# Patient Record
Sex: MALE | Race: WHITE | NOT HISPANIC OR LATINO | Employment: OTHER | URBAN - METROPOLITAN AREA
[De-identification: names, ages, dates, MRNs, and addresses within clinical notes are randomized per-mention and may not be internally consistent; named-entity substitution may affect disease eponyms.]

---

## 2023-03-22 PROBLEM — E53.8 VITAMIN B 12 DEFICIENCY: Status: ACTIVE | Noted: 2023-03-22

## 2023-03-22 PROBLEM — E03.9 HYPOTHYROIDISM: Status: ACTIVE | Noted: 2023-03-22

## 2023-03-22 PROBLEM — M19.079 OSTEOARTHRITIS OF TOE: Status: ACTIVE | Noted: 2023-03-22

## 2023-03-22 PROBLEM — G25.81 RLS (RESTLESS LEGS SYNDROME): Status: ACTIVE | Noted: 2023-03-22

## 2023-03-22 PROBLEM — D64.9 ANEMIA: Status: ACTIVE | Noted: 2023-03-22

## 2023-03-22 PROBLEM — G56.01 CARPAL TUNNEL SYNDROME OF RIGHT WRIST: Status: ACTIVE | Noted: 2023-03-22

## 2023-03-22 PROBLEM — I10 HTN (HYPERTENSION): Status: ACTIVE | Noted: 2023-03-22

## 2023-03-22 PROBLEM — E55.9 VITAMIN D DEFICIENCY: Status: ACTIVE | Noted: 2023-03-22

## 2023-03-22 PROBLEM — R60.9 DEPENDENT EDEMA: Status: ACTIVE | Noted: 2023-03-22

## 2023-03-22 PROBLEM — D72.819 CHRONIC LEUKOPENIA: Status: ACTIVE | Noted: 2023-03-22

## 2023-03-22 RX ORDER — FUROSEMIDE 40 MG/1
40 TABLET ORAL
COMMUNITY
Start: 2022-05-17 | End: 2023-05-05 | Stop reason: SDUPTHER

## 2023-03-22 RX ORDER — ERGOCALCIFEROL 1.25 MG/1
1 CAPSULE ORAL
COMMUNITY
Start: 2022-05-19 | End: 2023-06-01 | Stop reason: SDUPTHER

## 2023-03-22 RX ORDER — DULOXETIN HYDROCHLORIDE 30 MG/1
1 CAPSULE, DELAYED RELEASE ORAL DAILY
COMMUNITY
Start: 2022-04-29 | End: 2023-05-05 | Stop reason: SDUPTHER

## 2023-03-22 RX ORDER — DOCUSATE SODIUM 100 MG/1
1 CAPSULE, LIQUID FILLED ORAL 2 TIMES DAILY
COMMUNITY
Start: 2022-04-29 | End: 2023-06-01 | Stop reason: ALTCHOICE

## 2023-03-22 RX ORDER — TAMSULOSIN HYDROCHLORIDE 0.4 MG/1
2 CAPSULE ORAL DAILY
COMMUNITY
Start: 2022-04-29 | End: 2023-05-05 | Stop reason: SDUPTHER

## 2023-03-22 RX ORDER — FINASTERIDE 5 MG/1
1 TABLET, FILM COATED ORAL DAILY
COMMUNITY
Start: 2022-04-29 | End: 2023-05-05 | Stop reason: SDUPTHER

## 2023-03-22 RX ORDER — ROPINIROLE 4 MG/1
4 TABLET, FILM COATED ORAL 2 TIMES DAILY
COMMUNITY
Start: 2017-10-27 | End: 2023-06-01 | Stop reason: SDUPTHER

## 2023-03-22 RX ORDER — POTASSIUM CHLORIDE 750 MG/1
10 TABLET, EXTENDED RELEASE ORAL DAILY
COMMUNITY
Start: 2022-05-17 | End: 2023-05-05 | Stop reason: SDUPTHER

## 2023-03-22 RX ORDER — DOXAZOSIN 4 MG/1
1 TABLET ORAL NIGHTLY
COMMUNITY
Start: 2018-04-30 | End: 2023-05-23

## 2023-03-22 RX ORDER — OMEPRAZOLE 20 MG/1
20 CAPSULE, DELAYED RELEASE ORAL DAILY
COMMUNITY
Start: 2022-05-01 | End: 2023-05-05 | Stop reason: SDUPTHER

## 2023-03-28 ENCOUNTER — APPOINTMENT (OUTPATIENT)
Dept: PRIMARY CARE | Facility: CLINIC | Age: 88
End: 2023-03-28

## 2023-04-12 ENCOUNTER — TELEPHONE (OUTPATIENT)
Dept: PRIMARY CARE | Facility: CLINIC | Age: 88
End: 2023-04-12
Payer: COMMERCIAL

## 2023-04-14 ENCOUNTER — TELEMEDICINE (OUTPATIENT)
Dept: PRIMARY CARE | Facility: CLINIC | Age: 88
End: 2023-04-14

## 2023-04-14 VITALS — BODY MASS INDEX: 25.46 KG/M2 | HEIGHT: 65 IN

## 2023-04-14 DIAGNOSIS — R35.0 BENIGN PROSTATIC HYPERPLASIA WITH URINARY FREQUENCY: ICD-10-CM

## 2023-04-14 DIAGNOSIS — N48.22 PENILE CELLULITIS: ICD-10-CM

## 2023-04-14 DIAGNOSIS — I10 PRIMARY HYPERTENSION: Primary | ICD-10-CM

## 2023-04-14 DIAGNOSIS — N40.1 BENIGN PROSTATIC HYPERPLASIA WITH URINARY FREQUENCY: ICD-10-CM

## 2023-04-14 DIAGNOSIS — L03.90 CELLULITIS, UNSPECIFIED CELLULITIS SITE: ICD-10-CM

## 2023-04-14 PROCEDURE — 99213 OFFICE O/P EST LOW 20 MIN: CPT | Performed by: INTERNAL MEDICINE

## 2023-04-14 RX ORDER — CEPHALEXIN 500 MG/1
500 CAPSULE ORAL 3 TIMES DAILY
Qty: 30 CAPSULE | Refills: 0 | Status: SHIPPED | OUTPATIENT
Start: 2023-04-14 | End: 2023-05-05 | Stop reason: ALTCHOICE

## 2023-04-14 RX ORDER — FLUCONAZOLE 150 MG/1
150 TABLET ORAL ONCE
Qty: 3 TABLET | Refills: 0 | Status: SHIPPED | OUTPATIENT
Start: 2023-04-14 | End: 2023-04-14

## 2023-04-14 NOTE — ASSESSMENT & PLAN NOTE
CBC BMP PSA urinalysis given Keflex 503 times a day probiotic 3 times a day Diflucan 151 a day for 3 days outpatient follow-up with urologist if not better go to emergency room right away

## 2023-04-14 NOTE — PROGRESS NOTES
Patient ID: Isra Steven is a 89 y.o. male who presents for Establish Care (Inflammation on his penis-- given by Urologist before).    Assessment/Plan     Problem List Items Addressed This Visit          Circulatory    HTN (hypertension) - Primary     Patients BP readings reviewed and addressed, as we age our arteries turn stiffer and less elastic. Restricting salt consumption and staying physically fit with regular exercise regimen is the only way to keep our vasculature less tonic. Studies have shown that keeping ideal body wt, exercise routine about 140 to 150 minutes a week, eating variety of plant based diet and drinking plentiful water are quite helpful. Monitor BP twice or once a week at home and bring log to be reviewed by me. Uncontrolled BP has long term consequences including heart failure, myocardial infarction, accelerated atherosclerosis and kidney dysfunction. Therapy reviewed and explained.              Genitourinary    Penile cellulitis     CBC BMP PSA urinalysis given Keflex 503 times a day probiotic 3 times a day Diflucan 151 a day for 3 days outpatient follow-up with urologist if not better go to emergency room right away            Other    Benign prostatic hyperplasia with urinary frequency     Other Visit Diagnoses       Cellulitis, unspecified cellulitis site        Relevant Medications    cephalexin (Keflex) 500 mg capsule    fluconazole (Diflucan) 150 mg tablet            Source of history: Nurse, Medical personnel, Medical record, Patient.  History limitation: None.      HPI  This is a 89-year-old patient who had a history of BPH hypertension hyperlipidemia edema gastritis restless legs seen by urologist noticed increased redness swelling pain in the penile foreskin also scrotal area onset acutely duration 1 week progressed acutely aggravating factor urinary incontinence immunocompromise host given Keflex Diflucan probiotics refer patient to urologist check the CBC BMP UA CNS and PSA  "follow-up  Allergies   Allergen Reactions    Brimonidine Itching    Dorzolamide-Timolol Itching    Metipranolol Itching    Timolol Itching    Travoprost Itching       Medications    Current Outpatient Medications   Medication Sig Dispense Refill    cephalexin (Keflex) 500 mg capsule Take 1 capsule (500 mg) by mouth in the morning and 1 capsule (500 mg) in the evening and 1 capsule (500 mg) before bedtime. 30 capsule 0    docusate sodium (Colace) 100 mg capsule Take 1 capsule (100 mg) by mouth in the morning and 1 capsule (100 mg) before bedtime.      doxazosin (Cardura) 4 mg tablet Take 1 tablet (4 mg) by mouth once daily at bedtime.      DULoxetine (Cymbalta) 30 mg DR capsule Take 1 capsule (30 mg) by mouth once daily.      ergocalciferol (Vitamin D-2) 1.25 MG (71579 UT) capsule Take 1 capsule (1,250 mcg) by mouth 1 (one) time per week.      finasteride (Proscar) 5 mg tablet Take 1 tablet (5 mg) by mouth once daily.      fluconazole (Diflucan) 150 mg tablet Take 1 tablet (150 mg) by mouth 1 time for 1 dose. 3 tablet 0    furosemide (Lasix) 40 mg tablet Take 1 tablet (40 mg) by mouth once daily in the morning. Take before meals.      omeprazole (PriLOSEC) 20 mg DR capsule Take 1 capsule (20 mg) by mouth once daily.      potassium chloride CR (Klor-Con M10) 10 mEq ER tablet Take 1 tablet (10 mEq) by mouth once daily.      rOPINIRole (Requip) 4 mg tablet Take 1 tablet (4 mg) by mouth in the morning and 1 tablet (4 mg) before bedtime.      tamsulosin (Flomax) 0.4 mg 24 hr capsule Take 2 capsules (0.8 mg) by mouth once daily.       No current facility-administered medications for this visit.       Objective   Visit Vitals  Ht 1.651 m (5' 5\")   BMI 25.46 kg/m²   Smoking Status Never   BSA 1.78 m²       .Doxy  This visit was completed via video audio relation to  covid 19 pandemic all issues as below that discuss and address but no physical exam was performed if it was felt that patient should be evaluated in clinic and " they have been advised to follow . Patient verbally consented to visit and spent  more than 50% discuss about patient's complaint of problem and plan        ROS  Constitutional: Denies fevers, chills, fatigue, weight loss/gain  HEENT: Denies HA, vision changes, hearing loss, sore throat  Cardiac: Denies CP, palpitations, edema  Respiratory: Denies SOB, cough, pleuritic chest pain, PND, orthopnea  GI: Denies N/V/D, abd pain, constipation, black/bloody stools  : Denies urinary changes, frequency, hematuria, urgency, retention, flank pain  MSK: Denies joint pain, joint swelling, back pain, neck pain, extremity pain  Neuro: Denies numbness, weakness, tingling    Immunization History   Administered Date(s) Administered    Influenza, seasonal, injectable 09/18/2018    Pfizer Purple Cap SARS-CoV-2 03/09/2021, 04/02/2021    Tdap 03/27/2018    Zoster, Unspecified 10/12/2020, 12/28/2020       No visits with results within 4 Month(s) from this visit.   Latest known visit with results is:   Legacy Encounter on 05/17/2022   Component Date Value Ref Range Status    WBC 05/17/2022 4.1 (L)  4.4 - 11.3 x10E9/L Final    nRBC 05/17/2022 0.0  0.0 - 0.0 /100 WBC Final    RBC 05/17/2022 3.11 (L)  4.50 - 5.90 x10E12/L Final    Hemoglobin 05/17/2022 10.1 (L)  13.5 - 17.5 g/dL Final    Hematocrit 05/17/2022 31.0 (L)  41.0 - 52.0 % Final    MCV 05/17/2022 100  80 - 100 fL Final    MCHC 05/17/2022 32.6  32.0 - 36.0 g/dL Final    Platelets 05/17/2022 141 (L)  150 - 450 x10E9/L Final    RDW 05/17/2022 14.6 (H)  11.5 - 14.5 % Final    Neutrophils % 05/17/2022 50.0  40.0 - 80.0 % Final    Immature Granulocytes %, Automated 05/17/2022 0.2  0.0 - 0.9 % Final    Lymphocytes % 05/17/2022 35.1  13.0 - 44.0 % Final    Monocytes % 05/17/2022 9.6  2.0 - 10.0 % Final    Eosinophils % 05/17/2022 4.4  0.0 - 6.0 % Final    Basophils % 05/17/2022 0.7  0.0 - 2.0 % Final    Neutrophils Absolute 05/17/2022 2.03  1.60 - 5.50 x10E9/L Final    Lymphocytes  Absolute 05/17/2022 1.43  0.80 - 3.00 x10E9/L Final    Monocytes Absolute 05/17/2022 0.39  0.05 - 0.80 x10E9/L Final    Eosinophils Absolute 05/17/2022 0.18  0.00 - 0.40 x10E9/L Final    Basophils Absolute 05/17/2022 0.03  0.00 - 0.10 x10E9/L Final    Hemoglobin A1C 05/17/2022 5.3  % Final    Estimated Average Glucose 05/17/2022 105  MG/DL Final    Iron 05/17/2022 52  35 - 150 ug/dL Final    PSA 05/17/2022 1.00  0.00 - 4.00 ng/mL Final    Glucose 05/17/2022 95  74 - 99 mg/dL Final    Sodium 05/17/2022 142  136 - 145 mmol/L Final    Potassium 05/17/2022 4.1  3.5 - 5.3 mmol/L Final    Chloride 05/17/2022 109 (H)  98 - 107 mmol/L Final    Bicarbonate 05/17/2022 27  21 - 32 mmol/L Final    Anion Gap 05/17/2022 10  10 - 20 mmol/L Final    Urea Nitrogen 05/17/2022 25 (H)  6 - 23 mg/dL Final    Creatinine 05/17/2022 1.13  0.50 - 1.30 mg/dL Final    GFR MALE 05/17/2022 60  >90 mL/min/1.73m2 Final    Calcium 05/17/2022 8.9  8.6 - 10.6 mg/dL Final    Albumin 05/17/2022 3.7  3.4 - 5.0 g/dL Final    Alkaline Phosphatase 05/17/2022 94  33 - 136 U/L Final    Total Protein 05/17/2022 6.5  6.4 - 8.2 g/dL Final    AST 05/17/2022 21  9 - 39 U/L Final    Total Bilirubin 05/17/2022 0.7  0.0 - 1.2 mg/dL Final    ALT (SGPT) 05/17/2022 11  10 - 52 U/L Final    TSH 05/17/2022 4.74 (H)  0.44 - 3.98 mIU/L Final    Vitamin B-12 05/17/2022 598  211 - 911 pg/mL Final    Vitamin D, 25-Hydroxy 05/17/2022 27 (A)  ng/mL Final    Color, Urine 05/17/2022 YELLOW  STRAW,YELLOW Final    Appearance, Urine 05/17/2022 CLEAR  CLEAR Final    Specific Gravity, Urine 05/17/2022 1.017  1.005 - 1.035 Final    pH, Urine 05/17/2022 5.0  5.0 - 8.0 Final    Protein, Urine 05/17/2022 NEGATIVE  NEGATIVE mg/dL Final    Glucose, Urine 05/17/2022 NEGATIVE  NEGATIVE mg/dL Final    Blood, Urine 05/17/2022 NEGATIVE  NEGATIVE Final    Ketones, Urine 05/17/2022 NEGATIVE  NEGATIVE mg/dL Final    Bilirubin, Urine 05/17/2022 NEGATIVE  NEGATIVE Final    Urobilinogen, Urine  05/17/2022 <2.0  0.0 - 1.9 mg/dL Final    Nitrite, Urine 05/17/2022 NEGATIVE  NEGATIVE Final    Leukocyte Esterase, Urine 05/17/2022 NEGATIVE  NEGATIVE Final       Radiology: Reviewed imaging in powerchart.  No results found.    Family History   Family history unknown: Yes     Social History     Socioeconomic History    Marital status:      Spouse name: None    Number of children: None    Years of education: None    Highest education level: None   Occupational History    None   Tobacco Use    Smoking status: Never    Smokeless tobacco: Never   Vaping Use    Vaping status: None   Substance and Sexual Activity    Alcohol use: Yes     Comment: occasional    Drug use: Never    Sexual activity: None   Other Topics Concern    None   Social History Narrative    None     Social Determinants of Health     Financial Resource Strain: Not on file   Food Insecurity: Not on file   Transportation Needs: Not on file   Physical Activity: Not on file   Stress: Not on file   Social Connections: Not on file   Intimate Partner Violence: Not on file   Housing Stability: Not on file     Past Medical History:   Diagnosis Date    Abnormal weight loss 08/23/2017    Weight loss, non-intentional    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, 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/30/2018    Traumatic amputation of fingertip, subsequent encounter    Do not resuscitate 06/08/2020    DNR (do not resuscitate)    Encounter for general adult medical examination without abnormal findings 08/24/2020    Medicare annual wellness visit, subsequent    Epigastric pain 06/17/2021    Midepigastric pain    Follicular cyst of the skin and subcutaneous tissue, unspecified 07/09/2019    Skin cyst    Impacted cerumen, bilateral 03/20/2017    Bilateral impacted cerumen    Left lower quadrant pain     Abdominal pain, LLQ    Localized edema 09/30/2021    Edema of both legs    Localized swelling, mass and lump, unspecified upper limb 08/23/2017     Shoulder mass    Low back pain, unspecified 09/18/2018    Acute left-sided low back pain without sciatica    Other conditions influencing health status 03/20/2017    History of cough    Other conditions influencing health status 01/05/2015    White coat hypertension    Other specified soft tissue disorders 02/25/2021    Swelling of both lower extremities    Periodic limb movement disorder 10/27/2017    Periodic limb movement disorder    Personal history of diseases of the blood and blood-forming organs and certain disorders involving the immune mechanism 01/06/2015    History of macrocytosis    Personal history of other diseases of male genital organs 02/02/2021    History of balanitis    Personal history of other diseases of male genital organs 02/01/2021    History of phimosis of penis    Personal history of other diseases of the respiratory system 10/26/2016    History of bronchiectasis    Personal history of other diseases of the respiratory system 10/26/2016    History of acute bronchitis    Personal history of other drug therapy 12/15/2014    History of influenza vaccination    Personal history of other endocrine, nutritional and metabolic disease     History of hypercholesterolemia    Personal history of other mental and behavioral disorders 06/03/2019    History of depression    Personal history of other specified conditions 08/24/2020    History of insomnia    Personal history of other specified conditions 08/24/2020    History of gynecomastia    Personal history of other specified conditions 09/25/2019    History of urinary frequency    Personal history of other specified conditions 08/23/2017    History of paresthesia    Personal history of other specified conditions 12/27/2020    History of dysphagia    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

## 2023-05-05 ENCOUNTER — OFFICE VISIT (OUTPATIENT)
Dept: PRIMARY CARE | Facility: CLINIC | Age: 88
End: 2023-05-05
Payer: COMMERCIAL

## 2023-05-05 VITALS
DIASTOLIC BLOOD PRESSURE: 64 MMHG | OXYGEN SATURATION: 98 % | TEMPERATURE: 97.2 F | SYSTOLIC BLOOD PRESSURE: 123 MMHG | BODY MASS INDEX: 23.19 KG/M2 | HEIGHT: 65 IN | HEART RATE: 60 BPM | WEIGHT: 139.2 LBS

## 2023-05-05 DIAGNOSIS — Z13.820 SCREENING FOR OSTEOPOROSIS: ICD-10-CM

## 2023-05-05 DIAGNOSIS — N40.1 BENIGN PROSTATIC HYPERPLASIA WITH URINARY FREQUENCY: ICD-10-CM

## 2023-05-05 DIAGNOSIS — G25.81 RLS (RESTLESS LEGS SYNDROME): ICD-10-CM

## 2023-05-05 DIAGNOSIS — E78.2 HYPERLIPEMIA, MIXED: ICD-10-CM

## 2023-05-05 DIAGNOSIS — Z00.00 MEDICARE ANNUAL WELLNESS VISIT, SUBSEQUENT: Primary | ICD-10-CM

## 2023-05-05 DIAGNOSIS — E55.9 VITAMIN D DEFICIENCY: ICD-10-CM

## 2023-05-05 DIAGNOSIS — F33.41 RECURRENT MAJOR DEPRESSIVE DISORDER, IN PARTIAL REMISSION (CMS-HCC): ICD-10-CM

## 2023-05-05 DIAGNOSIS — N18.9 CHRONIC KIDNEY DISEASE, UNSPECIFIED CKD STAGE: ICD-10-CM

## 2023-05-05 DIAGNOSIS — N48.22 PENILE CELLULITIS: ICD-10-CM

## 2023-05-05 DIAGNOSIS — K21.9 GASTROESOPHAGEAL REFLUX DISEASE WITHOUT ESOPHAGITIS: ICD-10-CM

## 2023-05-05 DIAGNOSIS — R35.0 BENIGN PROSTATIC HYPERPLASIA WITH URINARY FREQUENCY: ICD-10-CM

## 2023-05-05 DIAGNOSIS — E03.9 ACQUIRED HYPOTHYROIDISM: ICD-10-CM

## 2023-05-05 DIAGNOSIS — I10 PRIMARY HYPERTENSION: ICD-10-CM

## 2023-05-05 PROBLEM — R60.9 DEPENDENT EDEMA: Status: RESOLVED | Noted: 2023-03-22 | Resolved: 2023-05-05

## 2023-05-05 PROBLEM — D64.9 ANEMIA: Status: RESOLVED | Noted: 2023-03-22 | Resolved: 2023-05-05

## 2023-05-05 PROCEDURE — G0439 PPPS, SUBSEQ VISIT: HCPCS | Performed by: INTERNAL MEDICINE

## 2023-05-05 PROCEDURE — 3074F SYST BP LT 130 MM HG: CPT | Performed by: INTERNAL MEDICINE

## 2023-05-05 PROCEDURE — 1036F TOBACCO NON-USER: CPT | Performed by: INTERNAL MEDICINE

## 2023-05-05 PROCEDURE — 99213 OFFICE O/P EST LOW 20 MIN: CPT | Performed by: INTERNAL MEDICINE

## 2023-05-05 PROCEDURE — 3078F DIAST BP <80 MM HG: CPT | Performed by: INTERNAL MEDICINE

## 2023-05-05 PROCEDURE — 1160F RVW MEDS BY RX/DR IN RCRD: CPT | Performed by: INTERNAL MEDICINE

## 2023-05-05 PROCEDURE — 1170F FXNL STATUS ASSESSED: CPT | Performed by: INTERNAL MEDICINE

## 2023-05-05 PROCEDURE — 1157F ADVNC CARE PLAN IN RCRD: CPT | Performed by: INTERNAL MEDICINE

## 2023-05-05 PROCEDURE — 99497 ADVNCD CARE PLAN 30 MIN: CPT | Performed by: INTERNAL MEDICINE

## 2023-05-05 PROCEDURE — 1159F MED LIST DOCD IN RCRD: CPT | Performed by: INTERNAL MEDICINE

## 2023-05-05 RX ORDER — DULOXETIN HYDROCHLORIDE 30 MG/1
30 CAPSULE, DELAYED RELEASE ORAL DAILY
Qty: 90 CAPSULE | Refills: 2 | Status: SHIPPED | OUTPATIENT
Start: 2023-05-05 | End: 2023-06-01 | Stop reason: SDUPTHER

## 2023-05-05 RX ORDER — ROPINIROLE 4 MG/1
4 TABLET, FILM COATED ORAL 2 TIMES DAILY
Qty: 180 TABLET | Refills: 3 | Status: CANCELLED | OUTPATIENT
Start: 2023-05-05

## 2023-05-05 RX ORDER — OMEPRAZOLE 20 MG/1
20 CAPSULE, DELAYED RELEASE ORAL DAILY
Qty: 90 CAPSULE | Refills: 3 | Status: SHIPPED | OUTPATIENT
Start: 2023-05-05 | End: 2023-09-22 | Stop reason: ALTCHOICE

## 2023-05-05 RX ORDER — FINASTERIDE 5 MG/1
5 TABLET, FILM COATED ORAL DAILY
Qty: 90 TABLET | Refills: 3 | Status: SHIPPED | OUTPATIENT
Start: 2023-05-05

## 2023-05-05 RX ORDER — BACITRACIN ZINC 500 UNIT/G
OINTMENT (GRAM) TOPICAL 2 TIMES DAILY
Qty: 30 G | Refills: 0 | Status: SHIPPED | OUTPATIENT
Start: 2023-05-05

## 2023-05-05 RX ORDER — FUROSEMIDE 40 MG/1
40 TABLET ORAL
Qty: 90 TABLET | Refills: 3 | Status: SHIPPED | OUTPATIENT
Start: 2023-05-05

## 2023-05-05 RX ORDER — TAMSULOSIN HYDROCHLORIDE 0.4 MG/1
0.8 CAPSULE ORAL DAILY
Qty: 180 CAPSULE | Refills: 3 | Status: SHIPPED | OUTPATIENT
Start: 2023-05-05

## 2023-05-05 RX ORDER — POTASSIUM CHLORIDE 750 MG/1
10 TABLET, FILM COATED, EXTENDED RELEASE ORAL DAILY
Qty: 90 TABLET | Refills: 3 | Status: SHIPPED | OUTPATIENT
Start: 2023-05-05

## 2023-05-05 RX ORDER — CEPHALEXIN 250 MG/1
250 CAPSULE ORAL 3 TIMES DAILY
Qty: 30 CAPSULE | Refills: 0 | Status: SHIPPED | OUTPATIENT
Start: 2023-05-05 | End: 2023-06-01 | Stop reason: ALTCHOICE

## 2023-05-05 ASSESSMENT — PATIENT HEALTH QUESTIONNAIRE - PHQ9
1. LITTLE INTEREST OR PLEASURE IN DOING THINGS: SEVERAL DAYS
SUM OF ALL RESPONSES TO PHQ9 QUESTIONS 1 AND 2: 0
2. FEELING DOWN, DEPRESSED OR HOPELESS: NOT AT ALL
SUM OF ALL RESPONSES TO PHQ9 QUESTIONS 1 AND 2: 2
10. IF YOU CHECKED OFF ANY PROBLEMS, HOW DIFFICULT HAVE THESE PROBLEMS MADE IT FOR YOU TO DO YOUR WORK, TAKE CARE OF THINGS AT HOME, OR GET ALONG WITH OTHER PEOPLE: SOMEWHAT DIFFICULT
1. LITTLE INTEREST OR PLEASURE IN DOING THINGS: NOT AT ALL
2. FEELING DOWN, DEPRESSED OR HOPELESS: SEVERAL DAYS

## 2023-05-05 ASSESSMENT — ENCOUNTER SYMPTOMS
LOSS OF SENSATION IN FEET: 0
DEPRESSION: 0
OCCASIONAL FEELINGS OF UNSTEADINESS: 1

## 2023-05-05 ASSESSMENT — ACTIVITIES OF DAILY LIVING (ADL)
BATHING: INDEPENDENT
MANAGING_FINANCES: NEEDS ASSISTANCE
DRESSING: INDEPENDENT
GROCERY_SHOPPING: NEEDS ASSISTANCE
TAKING_MEDICATION: NEEDS ASSISTANCE
DOING_HOUSEWORK: NEEDS ASSISTANCE

## 2023-05-05 NOTE — PROGRESS NOTES
Assessment and Plan:  Problem List Items Addressed This Visit          Nervous    RLS (restless legs syndrome)    Overview     3/2019: iron 84, 34% sat., ferritin 209         Current Assessment & Plan     Magnesium iron Requip            Circulatory    HTN (hypertension)    Overview     labile         Current Assessment & Plan     Patients BP readings reviewed and addressed, as we age our arteries turn stiffer and less elastic. Restricting salt consumption and staying physically fit with regular exercise regimen is the only way to keep our vasculature less tonic. Studies have shown that keeping ideal body wt, exercise routine about 140 to 150 minutes a week, eating variety of plant based diet and drinking plentiful water are quite helpful. Monitor BP twice or once a week at home and bring log to be reviewed by me. Uncontrolled BP has long term consequences including heart failure, myocardial infarction, accelerated atherosclerosis and kidney dysfunction. Therapy reviewed and explained.              Digestive    Gastroesophageal reflux disease without esophagitis    Relevant Medications    omeprazole (PriLOSEC) 20 mg DR capsule       Genitourinary    Penile cellulitis    Relevant Orders    CBC    Chronic kidney disease    Current Assessment & Plan     CKD and various stages of CKD and significance explained, CKD is very common and rising diagnosis among [place] adults. Main culprits for CKD etiology needs to be attended well. GFR values explained. Nephrotoxic agents has to be avoided, plenty of water consumption is always beneficial. Avoid NSAIDs, always check with MD about usage of OTC medications or any other Rx given by other providers. GFR less then 10 usually will need renal replacement therapy. Episodic check on renal functions needed. Always ask MD about GFR at next visit. Avoid dehydration.          Relevant Medications    furosemide (Lasix) 40 mg tablet    potassium chloride CR 10 mEq ER tablet        Endocrine/Metabolic    Hypothyroidism    Current Assessment & Plan     TSH B12 vitamin D twice a year         Relevant Orders    TSH with reflex to Free T4 if abnormal    Vitamin D deficiency    Relevant Orders    Vitamin D, Total       Other    Benign prostatic hyperplasia with urinary frequency    Relevant Medications    finasteride (Proscar) 5 mg tablet    tamsulosin (Flomax) 0.4 mg 24 hr capsule    Other Relevant Orders    Prostate Specific Antigen, Screen    Urinalysis Microscopic Only    Medicare annual wellness visit, subsequent - Primary    Current Assessment & Plan     Skin cancer colon cancer prostate cancer screening    Flu pneumonia COVID-19 vaccine    Fall prevention          Other Visit Diagnoses       Recurrent major depressive disorder, in partial remission (CMS/[facility])        Relevant Medications    DULoxetine (Cymbalta) 30 mg DR capsule    Screening for osteoporosis        Relevant Orders    XR DEXA bone density    Hyperlipemia, mixed        Relevant Orders    Lipid Panel    Comprehensive Metabolic Panel            I spent 15 minutes obtaining and discussing depression screening using pHq-2 questions with patient documented in the chart treatment plan discussI spent 15 minutes face-to-face  Voluntary discuss with patient about Advance care planning DO NOT RESUSCITATE I  spent more than 18 minutes discussing advanced Planning including an explanation and discussion of advanced directives discussed about a living will and power of  patient was encouraged to work on completing this documentation options are1= DO NOT RESUSCITATE CC2=, DO NOT RESUSCITATE  at arrest,3= full code documented in the chart.  Chief Complaint:   Medicare Wellness Exam/Comprehensive Problem Focused Follow Up and Physical Exam    HPI:  89-year-old patient reviewed over have a 3 brother 1 sister breast sister have breast cancer    Mother have dementia    Father  from old age    Personal history of restless legs anxiety  depression vascular dementia gastritis osteopenia osteoarthritis kyphoscoliosis complaining of the pressure ulcer sacrum and penile cellulitis advised wound care evaluation surgical evaluation given Keflex bacitracin sent to follow-up    Negative for suicide    Negative for hematuria rectal bleeding or COVID-19    Active Problem List  Patient Active Problem List   Diagnosis    Carpal tunnel syndrome of right wrist    Chronic leukopenia    HTN (hypertension)    Hypothyroidism    Osteoarthritis of toe    RLS (restless legs syndrome)    Vitamin B 12 deficiency    Vitamin D deficiency    Benign prostatic hyperplasia with urinary frequency    Penile cellulitis    Gastroesophageal reflux disease without esophagitis    Chronic kidney disease    Medicare annual wellness visit, subsequent       Comprehensive Medical/Surgical/Social/Family History  Past Medical History:   Diagnosis Date    Abnormal weight loss 08/23/2017    Weight loss, non-intentional    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, 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/30/2018    Traumatic amputation of fingertip, subsequent encounter    Do not resuscitate 06/08/2020    DNR (do not resuscitate)    Encounter for general adult medical examination without abnormal findings 08/24/2020    Medicare annual wellness visit, subsequent    Epigastric pain 06/17/2021    Midepigastric pain    Follicular cyst of the skin and subcutaneous tissue, unspecified 07/09/2019    Skin cyst    Impacted cerumen, bilateral 03/20/2017    Bilateral impacted cerumen    Left lower quadrant pain     Abdominal pain, LLQ    Localized edema 09/30/2021    Edema of both legs    Localized swelling, mass and lump, unspecified upper limb 08/23/2017    Shoulder mass    Low back pain, unspecified 09/18/2018    Acute left-sided low back pain without sciatica    Other conditions influencing health status 03/20/2017    History of cough    Other conditions influencing health status  01/05/2015    White coat hypertension    Other specified soft tissue disorders 02/25/2021    Swelling of both lower extremities    Periodic limb movement disorder 10/27/2017    Periodic limb movement disorder    Personal history of diseases of the blood and blood-forming organs and certain disorders involving the immune mechanism 01/06/2015    History of macrocytosis    Personal history of other diseases of male genital organs 02/02/2021    History of balanitis    Personal history of other diseases of male genital organs 02/01/2021    History of phimosis of penis    Personal history of other diseases of the respiratory system 10/26/2016    History of bronchiectasis    Personal history of other diseases of the respiratory system 10/26/2016    History of acute bronchitis    Personal history of other drug therapy 12/15/2014    History of influenza vaccination    Personal history of other endocrine, nutritional and metabolic disease     History of hypercholesterolemia    Personal history of other mental and behavioral disorders 06/03/2019    History of depression    Personal history of other specified conditions 08/24/2020    History of insomnia    Personal history of other specified conditions 08/24/2020    History of gynecomastia    Personal history of other specified conditions 09/25/2019    History of urinary frequency    Personal history of other specified conditions 08/23/2017    History of paresthesia    Personal history of other specified conditions 12/27/2020    History of dysphagia    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    Plantar fasciitis, right    Right lower quadrant pain 06/08/2020    Right lower quadrant abdominal pain     Past Surgical History:   Procedure Laterality Date    CATARACT EXTRACTION  12/12/2014    Cataract Extraction    HERNIA REPAIR  12/12/2014    Hernia Repair    HIP SURGERY      fracture     Social History     Social History Narrative    Not on file     Tobacco/Alcohol/Opioid use, as  well as Illicit Drug Use was screened for/reviewed and documented in Social Documentation section of the chart and medication list as appropriate    Allergies and Medications  Brimonidine, Dorzolamide-timolol, Metipranolol, Timolol, and Travoprost  Current Outpatient Medications   Medication Instructions    docusate sodium (Colace) 100 mg capsule 1 capsule, oral, 2 times daily    doxazosin (Cardura) 4 mg tablet 1 tablet, oral, Nightly    DULoxetine (CYMBALTA) 30 mg, oral, Daily    ergocalciferol (Vitamin D-2) 1.25 MG (73321 UT) capsule 1 capsule, oral, Weekly    finasteride (PROSCAR) 5 mg, oral, Daily    furosemide (LASIX) 40 mg, oral, Daily before breakfast    omeprazole (PRILOSEC) 20 mg, oral, Daily    potassium chloride CR 10 mEq ER tablet 10 mEq, oral, Daily    rOPINIRole (REQUIP) 4 mg, oral, 2 times daily    tamsulosin (FLOMAX) 0.8 mg, oral, Daily     Medications and Supplements  prescribed by me and other practitioners or clinical pharmacist (such as prescriptions, OTC's, herbal therapies and supplements) were reviewed and documented in the medical record.          Advance directives  Advanced Care Planning (including a Living Will, Healthcare POA, as well as specific end of life choices and/or directives), was discussed for approximately 16 minutes with the patient and/or surrogate, voluntarily, and documented in the Problem List of the medical record.     Cardiac Risk Assessment  Cardiovascular risk was discussed and, if needed, lifestyle modifications recommended, including nutritional choices, exercise, and elimination of habits contributing to risk. We agreed on a plan to reduce the current cardiovascular risk based on above discussion as needed.  Aspirin use/disuse was discussed and documented in the Problem List of the medical record after reviewing the updated guidelines below:    Consider low dose Aspirin ( mg) use if the benefit for cardiovascular disease prevention outweighs risk for bleeding  "complications.   In general, low dose ASA should be considered:  In patients WITHOUT prior MI/stroke/PAD (primary prevention):   a. Age <60: Use if 10-year cardiovascular disease risk >20%, with discussion of risks and benefits with patient  b. Age 60-<70: Use if 10-year cardiovascular disease risk >20% and low bleeding (e.g., gastrointenstinal) risk, with discussion of risks and benefits with patient  c. Age >=70: Do not use    In patients WITH prior MI/stroke/PAD (secondary prevention):   Generally use unless extremely high bleeding (e.g., gastrointenstinal) risk, with discussion of risks and benefits with patient    ROS otherwise negative aside from what was mentioned above in HPI.    Vitals  /64 (BP Location: Right arm, Patient Position: Sitting, BP Cuff Size: Adult)   Pulse 60   Temp 36.2 °C (97.2 °F)   Ht 1.651 m (5' 5\")   Wt 63.1 kg (139 lb 3.2 oz)   SpO2 98%   BMI 23.16 kg/m²   Body mass index is 23.16 kg/m².  Physical Exam  Gen: Alert, NAD cachexia of chronic disease  HEENT:  Unremarkable  Neck:  No TALI  Respiratory:  Lungs CTAB expiratory rhonchi  Cardiovascular:  Heart RRR systolic heart murmur  Neuro:  Gross motor and sensory intact neuropathy  Skin:  No suspicious lesions present pressure ulcer gluteal fold penile cellulitis  Breast: No masses, or axillary lymphadenopathy  Musculoskeletal status post scoliosis osteopenia osteoarthritis    During the course of the visit the patient was educated and counseled about age appropriate screening and preventive services. Completed preventive screenings were documented in the chart and orders were placed for outstanding screenings/procedures as documented in the Assessment and Plan.    Patient Instructions (the written plan) was given to the patient at check out.  "

## 2023-05-05 NOTE — ASSESSMENT & PLAN NOTE
CKD and various stages of CKD and significance explained, CKD is very common and rising diagnosis among [place] adults. Main culprits for CKD etiology needs to be attended well. GFR values explained. Nephrotoxic agents has to be avoided, plenty of water consumption is always beneficial. Avoid NSAIDs, always check with MD about usage of OTC medications or any other Rx given by other providers. GFR less then 10 usually will need renal replacement therapy. Episodic check on renal functions needed. Always ask MD about GFR at next visit. Avoid dehydration.

## 2023-05-05 NOTE — ASSESSMENT & PLAN NOTE
Skin cancer colon cancer prostate cancer screening    Flu pneumonia COVID-19 vaccine    Fall prevention

## 2023-05-15 ENCOUNTER — TELEPHONE (OUTPATIENT)
Dept: PRIMARY CARE | Facility: CLINIC | Age: 88
End: 2023-05-15
Payer: COMMERCIAL

## 2023-05-15 DIAGNOSIS — G25.81 RLS (RESTLESS LEGS SYNDROME): ICD-10-CM

## 2023-05-15 NOTE — TELEPHONE ENCOUNTER
Were you going to give him something in place of the ropinirol 4mg bid for RLS or were you going to increase the medication    Please advise    Statement Selected

## 2023-05-18 RX ORDER — GABAPENTIN 100 MG/1
100 CAPSULE ORAL NIGHTLY
Qty: 90 CAPSULE | Refills: 0 | Status: SHIPPED | OUTPATIENT
Start: 2023-05-18 | End: 2023-06-01 | Stop reason: SDUPTHER

## 2023-05-22 DIAGNOSIS — G25.81 RESTLESS LEGS SYNDROME: ICD-10-CM

## 2023-05-22 DIAGNOSIS — N39.0 URINARY TRACT INFECTION WITHOUT HEMATURIA, SITE UNSPECIFIED: ICD-10-CM

## 2023-05-22 RX ORDER — ROPINIROLE 4 MG/1
TABLET, FILM COATED ORAL
Qty: 180 TABLET | Refills: 3 | OUTPATIENT
Start: 2023-05-22

## 2023-05-23 DIAGNOSIS — I10 ESSENTIAL (PRIMARY) HYPERTENSION: ICD-10-CM

## 2023-05-23 RX ORDER — DOXAZOSIN 4 MG/1
TABLET ORAL
Qty: 90 TABLET | Refills: 3 | Status: SHIPPED | OUTPATIENT
Start: 2023-05-23

## 2023-06-01 ENCOUNTER — LAB (OUTPATIENT)
Dept: LAB | Facility: LAB | Age: 88
End: 2023-06-01
Payer: COMMERCIAL

## 2023-06-01 ENCOUNTER — OFFICE VISIT (OUTPATIENT)
Dept: PRIMARY CARE | Facility: CLINIC | Age: 88
End: 2023-06-01
Payer: COMMERCIAL

## 2023-06-01 VITALS
BODY MASS INDEX: 23.16 KG/M2 | TEMPERATURE: 97 F | DIASTOLIC BLOOD PRESSURE: 70 MMHG | SYSTOLIC BLOOD PRESSURE: 125 MMHG | HEART RATE: 55 BPM | WEIGHT: 139 LBS | OXYGEN SATURATION: 97 % | HEIGHT: 65 IN

## 2023-06-01 DIAGNOSIS — G25.81 RLS (RESTLESS LEGS SYNDROME): ICD-10-CM

## 2023-06-01 DIAGNOSIS — M21.619 BUNION OF GREAT TOE: ICD-10-CM

## 2023-06-01 DIAGNOSIS — R35.0 BENIGN PROSTATIC HYPERPLASIA WITH URINARY FREQUENCY: ICD-10-CM

## 2023-06-01 DIAGNOSIS — E78.2 HYPERLIPEMIA, MIXED: ICD-10-CM

## 2023-06-01 DIAGNOSIS — N40.1 BENIGN PROSTATIC HYPERPLASIA WITH URINARY FREQUENCY: ICD-10-CM

## 2023-06-01 DIAGNOSIS — E55.9 VITAMIN D DEFICIENCY: ICD-10-CM

## 2023-06-01 DIAGNOSIS — N48.22 PENILE CELLULITIS: ICD-10-CM

## 2023-06-01 DIAGNOSIS — E03.9 ACQUIRED HYPOTHYROIDISM: ICD-10-CM

## 2023-06-01 DIAGNOSIS — L89.42: ICD-10-CM

## 2023-06-01 DIAGNOSIS — N48.22 PENILE CELLULITIS: Primary | ICD-10-CM

## 2023-06-01 DIAGNOSIS — G56.01 CARPAL TUNNEL SYNDROME OF RIGHT WRIST: ICD-10-CM

## 2023-06-01 DIAGNOSIS — F33.41 RECURRENT MAJOR DEPRESSIVE DISORDER, IN PARTIAL REMISSION (CMS-HCC): ICD-10-CM

## 2023-06-01 DIAGNOSIS — N18.30 STAGE 3 CHRONIC KIDNEY DISEASE, UNSPECIFIED WHETHER STAGE 3A OR 3B CKD (MULTI): ICD-10-CM

## 2023-06-01 PROBLEM — L89.002: Status: ACTIVE | Noted: 2023-06-01

## 2023-06-01 LAB
ALANINE AMINOTRANSFERASE (SGPT) (U/L) IN SER/PLAS: 11 U/L (ref 10–52)
ALBUMIN (G/DL) IN SER/PLAS: 4 G/DL (ref 3.4–5)
ALKALINE PHOSPHATASE (U/L) IN SER/PLAS: 77 U/L (ref 33–136)
ANION GAP IN SER/PLAS: 14 MMOL/L (ref 10–20)
ASPARTATE AMINOTRANSFERASE (SGOT) (U/L) IN SER/PLAS: 21 U/L (ref 9–39)
BACTERIA, URINE: ABNORMAL /HPF
BILIRUBIN TOTAL (MG/DL) IN SER/PLAS: 0.9 MG/DL (ref 0–1.2)
CALCIDIOL (25 OH VITAMIN D3) (NG/ML) IN SER/PLAS: 47 NG/ML
CALCIUM (MG/DL) IN SER/PLAS: 9.6 MG/DL (ref 8.6–10.6)
CARBON DIOXIDE, TOTAL (MMOL/L) IN SER/PLAS: 31 MMOL/L (ref 21–32)
CHLORIDE (MMOL/L) IN SER/PLAS: 102 MMOL/L (ref 98–107)
CHOLESTEROL (MG/DL) IN SER/PLAS: 186 MG/DL (ref 0–199)
CHOLESTEROL IN HDL (MG/DL) IN SER/PLAS: 67.1 MG/DL
CHOLESTEROL/HDL RATIO: 2.8
CREATININE (MG/DL) IN SER/PLAS: 1.84 MG/DL (ref 0.5–1.3)
ERYTHROCYTE DISTRIBUTION WIDTH (RATIO) BY AUTOMATED COUNT: 13.6 % (ref 11.5–14.5)
ERYTHROCYTE MEAN CORPUSCULAR HEMOGLOBIN CONCENTRATION (G/DL) BY AUTOMATED: 32.2 G/DL (ref 32–36)
ERYTHROCYTE MEAN CORPUSCULAR VOLUME (FL) BY AUTOMATED COUNT: 105 FL (ref 80–100)
ERYTHROCYTES (10*6/UL) IN BLOOD BY AUTOMATED COUNT: 3.31 X10E12/L (ref 4.5–5.9)
GFR MALE: 33 ML/MIN/1.73M2
GLUCOSE (MG/DL) IN SER/PLAS: 93 MG/DL (ref 74–99)
HEMATOCRIT (%) IN BLOOD BY AUTOMATED COUNT: 34.8 % (ref 41–52)
HEMOGLOBIN (G/DL) IN BLOOD: 11.2 G/DL (ref 13.5–17.5)
HYALINE CASTS, URINE: ABNORMAL /LPF
LDL: 108 MG/DL (ref 0–99)
LEUKOCYTES (10*3/UL) IN BLOOD BY AUTOMATED COUNT: 3.9 X10E9/L (ref 4.4–11.3)
MUCUS, URINE: ABNORMAL /LPF
NRBC (PER 100 WBCS) BY AUTOMATED COUNT: 0 /100 WBC (ref 0–0)
PLATELETS (10*3/UL) IN BLOOD AUTOMATED COUNT: 128 X10E9/L (ref 150–450)
POTASSIUM (MMOL/L) IN SER/PLAS: 4.1 MMOL/L (ref 3.5–5.3)
PROSTATE SPECIFIC ANTIGEN,SCREEN: 1.78 NG/ML (ref 0–4)
PROTEIN TOTAL: 6.9 G/DL (ref 6.4–8.2)
RBC, URINE: 2 /HPF (ref 0–5)
SODIUM (MMOL/L) IN SER/PLAS: 143 MMOL/L (ref 136–145)
SQUAMOUS EPITHELIAL CELLS, URINE: 2 /HPF
THYROTROPIN (MIU/L) IN SER/PLAS BY DETECTION LIMIT <= 0.05 MIU/L: 5.93 MIU/L (ref 0.44–3.98)
THYROXINE (T4) FREE (NG/DL) IN SER/PLAS: 0.92 NG/DL (ref 0.78–1.48)
TRIGLYCERIDE (MG/DL) IN SER/PLAS: 57 MG/DL (ref 0–149)
UREA NITROGEN (MG/DL) IN SER/PLAS: 37 MG/DL (ref 6–23)
VLDL: 11 MG/DL (ref 0–40)
WBC, URINE: 7 /HPF (ref 0–5)

## 2023-06-01 PROCEDURE — 84153 ASSAY OF PSA TOTAL: CPT

## 2023-06-01 PROCEDURE — 81001 URINALYSIS AUTO W/SCOPE: CPT

## 2023-06-01 PROCEDURE — 3074F SYST BP LT 130 MM HG: CPT | Performed by: INTERNAL MEDICINE

## 2023-06-01 PROCEDURE — 3078F DIAST BP <80 MM HG: CPT | Performed by: INTERNAL MEDICINE

## 2023-06-01 PROCEDURE — 99214 OFFICE O/P EST MOD 30 MIN: CPT | Performed by: INTERNAL MEDICINE

## 2023-06-01 PROCEDURE — 1160F RVW MEDS BY RX/DR IN RCRD: CPT | Performed by: INTERNAL MEDICINE

## 2023-06-01 PROCEDURE — 80061 LIPID PANEL: CPT

## 2023-06-01 PROCEDURE — 84443 ASSAY THYROID STIM HORMONE: CPT

## 2023-06-01 PROCEDURE — 85027 COMPLETE CBC AUTOMATED: CPT

## 2023-06-01 PROCEDURE — 84439 ASSAY OF FREE THYROXINE: CPT

## 2023-06-01 PROCEDURE — 82306 VITAMIN D 25 HYDROXY: CPT

## 2023-06-01 PROCEDURE — 1157F ADVNC CARE PLAN IN RCRD: CPT | Performed by: INTERNAL MEDICINE

## 2023-06-01 PROCEDURE — 1036F TOBACCO NON-USER: CPT | Performed by: INTERNAL MEDICINE

## 2023-06-01 PROCEDURE — 1159F MED LIST DOCD IN RCRD: CPT | Performed by: INTERNAL MEDICINE

## 2023-06-01 PROCEDURE — 36415 COLL VENOUS BLD VENIPUNCTURE: CPT

## 2023-06-01 PROCEDURE — 80053 COMPREHEN METABOLIC PANEL: CPT

## 2023-06-01 RX ORDER — ROPINIROLE 4 MG/1
4 TABLET, FILM COATED ORAL 2 TIMES DAILY
Qty: 180 TABLET | Refills: 3 | Status: SHIPPED | OUTPATIENT
Start: 2023-06-01

## 2023-06-01 RX ORDER — DULOXETIN HYDROCHLORIDE 30 MG/1
30 CAPSULE, DELAYED RELEASE ORAL DAILY
Qty: 90 CAPSULE | Refills: 3 | Status: SHIPPED | OUTPATIENT
Start: 2023-06-01 | End: 2023-09-22 | Stop reason: ALTCHOICE

## 2023-06-01 RX ORDER — GABAPENTIN 100 MG/1
300 CAPSULE ORAL NIGHTLY
Qty: 90 CAPSULE | Refills: 0 | Status: SHIPPED | OUTPATIENT
Start: 2023-06-01 | End: 2023-07-17

## 2023-06-01 RX ORDER — ERGOCALCIFEROL 1.25 MG/1
1 CAPSULE ORAL
Qty: 12 CAPSULE | Refills: 0 | Status: SHIPPED | OUTPATIENT
Start: 2023-06-01 | End: 2023-10-23

## 2023-06-01 RX ORDER — GABAPENTIN 100 MG/1
100 CAPSULE ORAL NIGHTLY
Qty: 90 CAPSULE | Refills: 0 | Status: SHIPPED | OUTPATIENT
Start: 2023-06-01 | End: 2023-06-01 | Stop reason: SDUPTHER

## 2023-06-01 NOTE — PROGRESS NOTES
Subjective   Patient ID: Isra Steven is a 89 y.o. male who presents for Follow-up.    Assessment/Plan     Problem List Items Addressed This Visit          Nervous    Carpal tunnel syndrome of right wrist     Advised B12 folic acid gabapentin         RLS (restless legs syndrome)     Check iron magnesium potassium level continue Requip plus increased dose of gabapentin         Relevant Medications    rOPINIRole (Requip) 4 mg tablet    gabapentin (Neurontin) 100 mg capsule       Genitourinary    Penile cellulitis - Primary       Antibiotic probiotic refer patient to urology for possible surgery of foreskin         Chronic kidney disease     CKD and various stages of CKD and significance explained, CKD is very common and rising diagnosis among [place] adults. Main culprits for CKD etiology needs to be attended well. GFR values explained. Nephrotoxic agents has to be avoided, plenty of water consumption is always beneficial. Avoid NSAIDs, always check with MD about usage of OTC medications or any other Rx given by other providers. GFR less then 10 usually will need renal replacement therapy. Episodic check on renal functions needed. Always ask MD about GFR at next visit. Avoid dehydration.             Musculoskeletal    Bunion of great toe     Refer to podiatrist for possible medical management or surgery of the bunion            Endocrine/Metabolic    Vitamin D deficiency    Relevant Medications    ergocalciferol (Vitamin D-2) 1.25 MG (12603 UT) capsule       Other    Benign prostatic hyperplasia with urinary frequency    Recurrent major depressive disorder, in partial remission (CMS/HCC)    Relevant Medications    DULoxetine (Cymbalta) 30 mg DR capsule     Other Visit Diagnoses       Pressure injury of contiguous region involving left buttock and hip, stage 2 (CMS/HCC)              Patient was evaluated today, problem list was reviewed, problems and concerns addressed, Rx list reviewed and updated, lab and tests were  noted and reviewed. Life style changes were discussed, always it works better if we eat plant based diet and plenty of fibres and roughage. Consume adequate amount of water and avoid alcohol, light to moderate physical activities and stress reduction are always beneficial for ongoing physical well being. Do not forget to have 6 to 7 hours of sleep regularly and avoid late night sigifredo screen exposure.    HPI this is a 89-year-old patient who had a history of BPH anxiety depression hypertension hyperlipidemia gastritis restless legs also had a pressure ulcer affecting the sacral area complaining of the penile problem with urgency frequency difficulty to manage the urinary stream because of the foreskin problem seen by urology nonmedical nonsurgical treatment at this point get a second opinion for possible surgery    Also complaining of the pain discomfort difficult to walk or bear suis because of the bunion refer patient to podiatrist    Negative for fall    Negative for hematuria    Negative for COVID-19    Allergies   Allergen Reactions    Brimonidine Itching    Dorzolamide-Timolol Itching    Metipranolol Itching    Timolol Itching    Travoprost Itching       Current Outpatient Medications   Medication Sig Dispense Refill    bacitracin 500 unit/gram ointment Apply topically 2 times a day. 30 g 0    doxazosin (Cardura) 4 mg tablet TAKE 1 TABLET BY MOUTH EVERYDAY AT BEDTIME 90 tablet 3    finasteride (Proscar) 5 mg tablet Take 1 tablet (5 mg) by mouth once daily. 90 tablet 3    furosemide (Lasix) 40 mg tablet Take 1 tablet (40 mg) by mouth once daily in the morning. Take before meals. 90 tablet 3    omeprazole (PriLOSEC) 20 mg DR capsule Take 1 capsule (20 mg) by mouth once daily. 90 capsule 3    potassium chloride CR 10 mEq ER tablet Take 1 tablet (10 mEq) by mouth once daily. 90 tablet 3    tamsulosin (Flomax) 0.4 mg 24 hr capsule Take 2 capsules (0.8 mg) by mouth once daily. 180 capsule 3    DULoxetine (Cymbalta) 30  "mg DR capsule Take 1 capsule (30 mg) by mouth once daily. 90 capsule 3    ergocalciferol (Vitamin D-2) 1.25 MG (29271 UT) capsule Take 1 capsule (1,250 mcg) by mouth 1 (one) time per week. 12 capsule 0    gabapentin (Neurontin) 100 mg capsule Take 3 capsules (300 mg) by mouth once daily at bedtime. 90 capsule 0    rOPINIRole (Requip) 4 mg tablet Take 1 tablet (4 mg) by mouth 2 times a day. 180 tablet 3     No current facility-administered medications for this visit.       Objective   Visit Vitals  /70 (BP Location: Left arm, Patient Position: Sitting, BP Cuff Size: Adult)   Pulse 55   Temp 36.1 °C (97 °F)   Ht 1.651 m (5' 5\")   Wt 63 kg (139 lb)   SpO2 97%   BMI 23.13 kg/m²   Smoking Status Former   BSA 1.7 m²     Physical Exam  Constitutional:       Cachexia of chronic disease HENT:      Head: Normocephalic and atraumatic.      Nose: Nose normal.   Eyes:      Extraocular Movements: Extraocular movements intact.      Conjunctiva/sclera: Conjunctivae normal.   Cardiovascular:      Rate and Rhythm: Normal rate and regular rhythm.   Pulmonary:      Crackles rales rhonchi skin:     General: Skin is warm.   Neurological:      Tingling numbness lower extremity  Psychiatric:         Mood and Affect: Mood normal.         Behavior: Behavior normal.   Musculoskeletal high risk kyphoscoliotic spine  Dermatology    Pressure ulcer discoloration infection in the groin and foreskin of the penile area pressure ulcer on the sacral area stage II to stage III  Immunization History   Administered Date(s) Administered    Influenza, High Dose Seasonal, Preservative Free 01/14/2016    Influenza, Unspecified 12/15/2014, 09/27/2017    Influenza, injectable, quadrivalent, preservative free 09/18/2018    Influenza, seasonal, injectable 10/03/2013, 09/18/2018    Pfizer Purple Cap SARS-CoV-2 03/09/2021, 04/02/2021    Pneumococcal Polysaccharide PPSV23 02/29/2012    Tdap 03/27/2018    Zoster, Recombinant 10/12/2020, 12/28/2020    Zoster, " Unspecified 10/12/2020, 12/28/2020       Review of Systems    No visits with results within 4 Month(s) from this visit.   Latest known visit with results is:   [facility]acy Encounter on 05/17/2022   Component Date Value Ref Range Status    WBC 05/17/2022 4.1 (L)  4.4 - 11.3 x10E9/L Final    nRBC 05/17/2022 0.0  0.0 - 0.0 /100 WBC Final    RBC 05/17/2022 3.11 (L)  4.50 - 5.90 x10E12/L Final    Hemoglobin 05/17/2022 10.1 (L)  13.5 - 17.5 g/dL Final    Hematocrit 05/17/2022 31.0 (L)  41.0 - 52.0 % Final    MCV 05/17/2022 100  80 - 100 fL Final    MCHC 05/17/2022 32.6  32.0 - 36.0 g/dL Final    Platelets 05/17/2022 141 (L)  150 - 450 x10E9/L Final    RDW 05/17/2022 14.6 (H)  11.5 - 14.5 % Final    Neutrophils % 05/17/2022 50.0  40.0 - 80.0 % Final    Immature Granulocytes %, Automated 05/17/2022 0.2  0.0 - 0.9 % Final    Lymphocytes % 05/17/2022 35.1  13.0 - 44.0 % Final    Monocytes % 05/17/2022 9.6  2.0 - 10.0 % Final    Eosinophils % 05/17/2022 4.4  0.0 - 6.0 % Final    Basophils % 05/17/2022 0.7  0.0 - 2.0 % Final    Neutrophils Absolute 05/17/2022 2.03  1.60 - 5.50 x10E9/L Final    Lymphocytes Absolute 05/17/2022 1.43  0.80 - 3.00 x10E9/L Final    Monocytes Absolute 05/17/2022 0.39  0.05 - 0.80 x10E9/L Final    Eosinophils Absolute 05/17/2022 0.18  0.00 - 0.40 x10E9/L Final    Basophils Absolute 05/17/2022 0.03  0.00 - 0.10 x10E9/L Final    Hemoglobin A1C 05/17/2022 5.3  % Final    Estimated Average Glucose 05/17/2022 105  MG/DL Final    Iron 05/17/2022 52  35 - 150 ug/dL Final    PSA 05/17/2022 1.00  0.00 - 4.00 ng/mL Final    Glucose 05/17/2022 95  74 - 99 mg/dL Final    Sodium 05/17/2022 142  136 - 145 mmol/L Final    Potassium 05/17/2022 4.1  3.5 - 5.3 mmol/L Final    Chloride 05/17/2022 109 (H)  98 - 107 mmol/L Final    Bicarbonate 05/17/2022 27  21 - 32 mmol/L Final    Anion Gap 05/17/2022 10  10 - 20 mmol/L Final    Urea Nitrogen 05/17/2022 25 (H)  6 - 23 mg/dL Final    Creatinine 05/17/2022 1.13  0.50 - 1.30  mg/dL Final    GFR MALE 05/17/2022 60  >90 mL/min/1.73m2 Final    Calcium 05/17/2022 8.9  8.6 - 10.6 mg/dL Final    Albumin 05/17/2022 3.7  3.4 - 5.0 g/dL Final    Alkaline Phosphatase 05/17/2022 94  33 - 136 U/L Final    Total Protein 05/17/2022 6.5  6.4 - 8.2 g/dL Final    AST 05/17/2022 21  9 - 39 U/L Final    Total Bilirubin 05/17/2022 0.7  0.0 - 1.2 mg/dL Final    ALT (SGPT) 05/17/2022 11  10 - 52 U/L Final    TSH 05/17/2022 4.74 (H)  0.44 - 3.98 mIU/L Final    Vitamin B-12 05/17/2022 598  211 - 911 pg/mL Final    Vitamin D, 25-Hydroxy 05/17/2022 27 (A)  ng/mL Final    Color, Urine 05/17/2022 YELLOW  STRAW,YELLOW Final    Appearance, Urine 05/17/2022 CLEAR  CLEAR Final    Specific Gravity, Urine 05/17/2022 1.017  1.005 - 1.035 Final    pH, Urine 05/17/2022 5.0  5.0 - 8.0 Final    Protein, Urine 05/17/2022 NEGATIVE  NEGATIVE mg/dL Final    Glucose, Urine 05/17/2022 NEGATIVE  NEGATIVE mg/dL Final    Blood, Urine 05/17/2022 NEGATIVE  NEGATIVE Final    Ketones, Urine 05/17/2022 NEGATIVE  NEGATIVE mg/dL Final    Bilirubin, Urine 05/17/2022 NEGATIVE  NEGATIVE Final    Urobilinogen, Urine 05/17/2022 <2.0  0.0 - 1.9 mg/dL Final    Nitrite, Urine 05/17/2022 NEGATIVE  NEGATIVE Final    Leukocyte Esterase, Urine 05/17/2022 NEGATIVE  NEGATIVE Final       Radiology: Reviewed imaging in powerchart.  No results found.    Family History   Problem Relation Name Age of Onset    Dementia Mother      Glaucoma Mother      No Known Problems Father       Social History     Socioeconomic History    Marital status:      Spouse name: None    Number of children: None    Years of education: None    Highest education level: None   Occupational History    None   Tobacco Use    Smoking status: Former     Packs/day: 1.00     Years: 30.00     Pack years: 30.00     Types: Cigarettes    Smokeless tobacco: Never   Vaping Use    Vaping status: None   Substance and Sexual Activity    Alcohol use: Yes     Alcohol/week: 3.0 standard drinks of  alcohol     Types: 3 Shots of liquor per week    Drug use: Never    Sexual activity: None   Other Topics Concern    None   Social History Narrative    None     Social Determinants of Health     Financial Resource Strain: Not on file   Food Insecurity: Not on file   Transportation Needs: Not on file   Physical Activity: Not on file   Stress: Not on file   Social Connections: Not on file   Intimate Partner Violence: Not on file   Housing Stability: Not on file     Past Medical History:   Diagnosis Date    Abnormal weight loss 08/23/2017    Weight loss, non-intentional    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, 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/30/2018    Traumatic amputation of fingertip, subsequent encounter    Do not resuscitate 06/08/2020    DNR (do not resuscitate)    Encounter for general adult medical examination without abnormal findings 08/24/2020    Medicare annual wellness visit, subsequent    Epigastric pain 06/17/2021    Midepigastric pain    Follicular cyst of the skin and subcutaneous tissue, unspecified 07/09/2019    Skin cyst    Impacted cerumen, bilateral 03/20/2017    Bilateral impacted cerumen    Left lower quadrant pain     Abdominal pain, LLQ    Localized edema 09/30/2021    Edema of both legs    Localized swelling, mass and lump, unspecified upper limb 08/23/2017    Shoulder mass    Low back pain, unspecified 09/18/2018    Acute left-sided low back pain without sciatica    Other conditions influencing health status 03/20/2017    History of cough    Other conditions influencing health status 01/05/2015    White coat hypertension    Other specified soft tissue disorders 02/25/2021    Swelling of both lower extremities    Periodic limb movement disorder 10/27/2017    Periodic limb movement disorder    Personal history of diseases of the blood and blood-forming organs and certain disorders involving the immune mechanism 01/06/2015    History of macrocytosis    Personal history  of other diseases of male genital organs 02/02/2021    History of balanitis    Personal history of other diseases of male genital organs 02/01/2021    History of phimosis of penis    Personal history of other diseases of the respiratory system 10/26/2016    History of bronchiectasis    Personal history of other diseases of the respiratory system 10/26/2016    History of acute bronchitis    Personal history of other drug therapy 12/15/2014    History of influenza vaccination    Personal history of other endocrine, nutritional and metabolic disease     History of hypercholesterolemia    Personal history of other mental and behavioral disorders 06/03/2019    History of depression    Personal history of other specified conditions 08/24/2020    History of insomnia    Personal history of other specified conditions 08/24/2020    History of gynecomastia    Personal history of other specified conditions 09/25/2019    History of urinary frequency    Personal history of other specified conditions 08/23/2017    History of paresthesia    Personal history of other specified conditions 12/27/2020    History of dysphagia    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

## 2023-06-02 RX ORDER — CEPHALEXIN 250 MG/1
250 CAPSULE ORAL 3 TIMES DAILY
Qty: 21 CAPSULE | Refills: 0 | Status: SHIPPED | OUTPATIENT
Start: 2023-06-02 | End: 2023-07-20 | Stop reason: ALTCHOICE

## 2023-06-02 NOTE — TELEPHONE ENCOUNTER
----- Message from Pedro Montemayor MD sent at 6/2/2023 11:54 AM EDT -----  UTI thyroid dysfunction high LDL chronic kidney disease anemia leukopenia thrombocytopenia plan Keflex 250 mg 3 times a day for 1 week follow-up in a 2 weeks to get B12 and iron injection in the office

## 2023-06-12 PROCEDURE — G0180 MD CERTIFICATION HHA PATIENT: HCPCS | Performed by: INTERNAL MEDICINE

## 2023-07-16 DIAGNOSIS — G25.81 RLS (RESTLESS LEGS SYNDROME): ICD-10-CM

## 2023-07-17 RX ORDER — GABAPENTIN 100 MG/1
CAPSULE ORAL
Qty: 90 CAPSULE | Refills: 0 | Status: SHIPPED | OUTPATIENT
Start: 2023-07-17 | End: 2023-07-20 | Stop reason: SDUPTHER

## 2023-07-20 ENCOUNTER — OFFICE VISIT (OUTPATIENT)
Dept: PRIMARY CARE | Facility: CLINIC | Age: 88
End: 2023-07-20
Payer: COMMERCIAL

## 2023-07-20 ENCOUNTER — LAB (OUTPATIENT)
Dept: LAB | Facility: LAB | Age: 88
End: 2023-07-20
Payer: COMMERCIAL

## 2023-07-20 VITALS
DIASTOLIC BLOOD PRESSURE: 63 MMHG | WEIGHT: 141.6 LBS | OXYGEN SATURATION: 98 % | BODY MASS INDEX: 23.59 KG/M2 | HEART RATE: 73 BPM | SYSTOLIC BLOOD PRESSURE: 131 MMHG | HEIGHT: 65 IN | TEMPERATURE: 97.8 F

## 2023-07-20 DIAGNOSIS — E55.9 VITAMIN D DEFICIENCY: ICD-10-CM

## 2023-07-20 DIAGNOSIS — N48.22 PENILE CELLULITIS: ICD-10-CM

## 2023-07-20 DIAGNOSIS — I70.0 ATHEROSCLEROSIS OF AORTA (CMS-HCC): ICD-10-CM

## 2023-07-20 DIAGNOSIS — N30.00 ACUTE CYSTITIS WITHOUT HEMATURIA: ICD-10-CM

## 2023-07-20 DIAGNOSIS — E44.0 MODERATE PROTEIN-CALORIE MALNUTRITION (MULTI): ICD-10-CM

## 2023-07-20 DIAGNOSIS — N18.30 STAGE 3 CHRONIC KIDNEY DISEASE, UNSPECIFIED WHETHER STAGE 3A OR 3B CKD (MULTI): ICD-10-CM

## 2023-07-20 DIAGNOSIS — I50.20 SYSTOLIC HEART FAILURE, UNSPECIFIED HF CHRONICITY (MULTI): ICD-10-CM

## 2023-07-20 DIAGNOSIS — G25.81 RLS (RESTLESS LEGS SYNDROME): ICD-10-CM

## 2023-07-20 DIAGNOSIS — J47.1 BRONCHIECTASIS WITH ACUTE EXACERBATION (MULTI): Primary | ICD-10-CM

## 2023-07-20 DIAGNOSIS — D69.6 THROMBOCYTOPENIA, UNSPECIFIED (CMS-HCC): ICD-10-CM

## 2023-07-20 DIAGNOSIS — N39.0 UTI (URINARY TRACT INFECTION), UNCOMPLICATED: ICD-10-CM

## 2023-07-20 LAB
ANION GAP IN SER/PLAS: 9 MMOL/L (ref 10–20)
BASOPHILS (10*3/UL) IN BLOOD BY AUTOMATED COUNT: 0.03 X10E9/L (ref 0–0.1)
BASOPHILS/100 LEUKOCYTES IN BLOOD BY AUTOMATED COUNT: 0.7 % (ref 0–2)
CALCIUM (MG/DL) IN SER/PLAS: 9.5 MG/DL (ref 8.6–10.6)
CARBON DIOXIDE, TOTAL (MMOL/L) IN SER/PLAS: 33 MMOL/L (ref 21–32)
CHLORIDE (MMOL/L) IN SER/PLAS: 103 MMOL/L (ref 98–107)
CREATININE (MG/DL) IN SER/PLAS: 1.5 MG/DL (ref 0.5–1.3)
EOSINOPHILS (10*3/UL) IN BLOOD BY AUTOMATED COUNT: 0.22 X10E9/L (ref 0–0.4)
EOSINOPHILS/100 LEUKOCYTES IN BLOOD BY AUTOMATED COUNT: 5.1 % (ref 0–6)
ERYTHROCYTE DISTRIBUTION WIDTH (RATIO) BY AUTOMATED COUNT: 13.5 % (ref 11.5–14.5)
ERYTHROCYTE MEAN CORPUSCULAR HEMOGLOBIN CONCENTRATION (G/DL) BY AUTOMATED: 31.8 G/DL (ref 32–36)
ERYTHROCYTE MEAN CORPUSCULAR VOLUME (FL) BY AUTOMATED COUNT: 104 FL (ref 80–100)
ERYTHROCYTES (10*6/UL) IN BLOOD BY AUTOMATED COUNT: 3.16 X10E12/L (ref 4.5–5.9)
GFR MALE: 42 ML/MIN/1.73M2
GLUCOSE (MG/DL) IN SER/PLAS: 117 MG/DL (ref 74–99)
HEMATOCRIT (%) IN BLOOD BY AUTOMATED COUNT: 33 % (ref 41–52)
HEMOGLOBIN (G/DL) IN BLOOD: 10.5 G/DL (ref 13.5–17.5)
IMMATURE GRANULOCYTES/100 LEUKOCYTES IN BLOOD BY AUTOMATED COUNT: 0.2 % (ref 0–0.9)
LEUKOCYTES (10*3/UL) IN BLOOD BY AUTOMATED COUNT: 4.3 X10E9/L (ref 4.4–11.3)
LYMPHOCYTES (10*3/UL) IN BLOOD BY AUTOMATED COUNT: 1.44 X10E9/L (ref 0.8–3)
LYMPHOCYTES/100 LEUKOCYTES IN BLOOD BY AUTOMATED COUNT: 33.4 % (ref 13–44)
MONOCYTES (10*3/UL) IN BLOOD BY AUTOMATED COUNT: 0.4 X10E9/L (ref 0.05–0.8)
MONOCYTES/100 LEUKOCYTES IN BLOOD BY AUTOMATED COUNT: 9.3 % (ref 2–10)
NEUTROPHILS (10*3/UL) IN BLOOD BY AUTOMATED COUNT: 2.21 X10E9/L (ref 1.6–5.5)
NEUTROPHILS/100 LEUKOCYTES IN BLOOD BY AUTOMATED COUNT: 51.3 % (ref 40–80)
NRBC (PER 100 WBCS) BY AUTOMATED COUNT: 0 /100 WBC (ref 0–0)
PLATELETS (10*3/UL) IN BLOOD AUTOMATED COUNT: 132 X10E9/L (ref 150–450)
POTASSIUM (MMOL/L) IN SER/PLAS: 4.2 MMOL/L (ref 3.5–5.3)
RBC, URINE: 3 /HPF (ref 0–5)
SODIUM (MMOL/L) IN SER/PLAS: 141 MMOL/L (ref 136–145)
SQUAMOUS EPITHELIAL CELLS, URINE: <1 /HPF
UREA NITROGEN (MG/DL) IN SER/PLAS: 37 MG/DL (ref 6–23)
WBC, URINE: 1 /HPF (ref 0–5)

## 2023-07-20 PROCEDURE — 96372 THER/PROPH/DIAG INJ SC/IM: CPT | Performed by: INTERNAL MEDICINE

## 2023-07-20 PROCEDURE — 3078F DIAST BP <80 MM HG: CPT | Performed by: INTERNAL MEDICINE

## 2023-07-20 PROCEDURE — 99214 OFFICE O/P EST MOD 30 MIN: CPT | Performed by: INTERNAL MEDICINE

## 2023-07-20 PROCEDURE — 80048 BASIC METABOLIC PNL TOTAL CA: CPT

## 2023-07-20 PROCEDURE — 1125F AMNT PAIN NOTED PAIN PRSNT: CPT | Performed by: INTERNAL MEDICINE

## 2023-07-20 PROCEDURE — 85025 COMPLETE CBC W/AUTO DIFF WBC: CPT

## 2023-07-20 PROCEDURE — 1160F RVW MEDS BY RX/DR IN RCRD: CPT | Performed by: INTERNAL MEDICINE

## 2023-07-20 PROCEDURE — 36415 COLL VENOUS BLD VENIPUNCTURE: CPT

## 2023-07-20 PROCEDURE — 81001 URINALYSIS AUTO W/SCOPE: CPT

## 2023-07-20 PROCEDURE — 1036F TOBACCO NON-USER: CPT | Performed by: INTERNAL MEDICINE

## 2023-07-20 PROCEDURE — 1159F MED LIST DOCD IN RCRD: CPT | Performed by: INTERNAL MEDICINE

## 2023-07-20 PROCEDURE — 87086 URINE CULTURE/COLONY COUNT: CPT

## 2023-07-20 PROCEDURE — 1157F ADVNC CARE PLAN IN RCRD: CPT | Performed by: INTERNAL MEDICINE

## 2023-07-20 PROCEDURE — 3075F SYST BP GE 130 - 139MM HG: CPT | Performed by: INTERNAL MEDICINE

## 2023-07-20 RX ORDER — GABAPENTIN 300 MG/1
300 CAPSULE ORAL NIGHTLY
Qty: 90 CAPSULE | Refills: 1 | Status: SHIPPED | OUTPATIENT
Start: 2023-07-20

## 2023-07-20 RX ORDER — CEFTRIAXONE 1 G/1
1 INJECTION, POWDER, FOR SOLUTION INTRAMUSCULAR; INTRAVENOUS ONCE
Status: COMPLETED | OUTPATIENT
Start: 2023-07-20 | End: 2023-07-20

## 2023-07-20 RX ADMIN — CEFTRIAXONE 1 G: 1 INJECTION, POWDER, FOR SOLUTION INTRAMUSCULAR; INTRAVENOUS at 12:47

## 2023-07-20 NOTE — PROGRESS NOTES
Subjective   Patient ID: Isra Steven is a 89 y.o. male who presents for Follow-up (6 weeks/Go over blood work ).    Assessment/Plan     Problem List Items Addressed This Visit          Cardiac and Vasculature    Systolic heart failure, unspecified HF chronicity (CMS/HCC)    Atherosclerosis of aorta (CMS/HCC)     Atherosclerotic heart disease aorta disease cardiology to follow-up            Coag and Thromboembolic    Thrombocytopenia, unspecified (CMS/HCC)     B12 folic acid supplement            Endocrine/Metabolic    Vitamin D deficiency    Relevant Orders    CBC and Auto Differential    Urinalysis Microscopic Only    Urine Culture    Basic metabolic panel    Moderate protein-calorie malnutrition (CMS/HCC)     Ensure Plus twice a day dietitian evaluation            Genitourinary and Reproductive    Penile cellulitis     Advised to use a condom catheter         Relevant Orders    CBC and Auto Differential    Urinalysis Microscopic Only    Urine Culture    Basic metabolic panel    Chronic kidney disease     Cut down salt and protein         Relevant Orders    CBC and Auto Differential    Urinalysis Microscopic Only    Urine Culture    Basic metabolic panel       Neuro    RLS (restless legs syndrome)     Iron B12 folic acid Requip            Pulmonary and Pneumonias    Bronchiectasis with acute exacerbation (CMS/HCC) - Primary     Advised incentive spirometry flu pneumonia COVID-19 vaccines continue inhaler antibiotic        Patient was evaluated today, problem list was reviewed, problems and concerns addressed, Rx list reviewed and updated, lab and tests were noted and reviewed. Life style changes were discussed, always it works better if we eat plant based diet and plenty of fibres and roughage. Consume adequate amount of water and avoid alcohol, light to moderate physical activities and stress reduction are always beneficial for ongoing physical well being. Do not forget to have 6 to 7 hours of sleep regularly  and avoid late night sigifredo screen exposure.        HPI  This is a 89-year-old patient of chronic kidney disease chronic heart disease anxiety depression hypertension hyperlipidemia cystitis bronchitis cellulitis advised to use condom catheter review urinalysis CAT scan blood test medication with the patient's and patient's caregiver gabapentin helping neuropathic pain continue to do so meanwhile given Rocephin 1 g intramuscular that will help further bronchiectasis and chronic UTI advised to use a probiotic prevent C. difficile colitis follow-up 6 weeks patient is a DNR CC  Allergies   Allergen Reactions    Brimonidine Itching    Dorzolamide-Timolol Itching    Metipranolol Itching    Timolol Itching    Travoprost Itching       Current Outpatient Medications   Medication Sig Dispense Refill    bacitracin 500 unit/gram ointment Apply topically 2 times a day. 30 g 0    doxazosin (Cardura) 4 mg tablet TAKE 1 TABLET BY MOUTH EVERYDAY AT BEDTIME 90 tablet 3    DULoxetine (Cymbalta) 30 mg DR capsule Take 1 capsule (30 mg) by mouth once daily. 90 capsule 3    ergocalciferol (Vitamin D-2) 1.25 MG (70474 UT) capsule Take 1 capsule (1,250 mcg) by mouth 1 (one) time per week. 12 capsule 0    finasteride (Proscar) 5 mg tablet Take 1 tablet (5 mg) by mouth once daily. 90 tablet 3    furosemide (Lasix) 40 mg tablet Take 1 tablet (40 mg) by mouth once daily in the morning. Take before meals. 90 tablet 3    gabapentin (Neurontin) 100 mg capsule TAKE 1 CAPSULE (100 MG) BY MOUTH ONCE DAILY AT BEDTIME. (Patient taking differently: Take 3 capsules (300 mg) by mouth once daily at bedtime.) 90 capsule 0    omeprazole (PriLOSEC) 20 mg DR capsule Take 1 capsule (20 mg) by mouth once daily. 90 capsule 3    potassium chloride CR 10 mEq ER tablet Take 1 tablet (10 mEq) by mouth once daily. 90 tablet 3    rOPINIRole (Requip) 4 mg tablet Take 1 tablet (4 mg) by mouth 2 times a day. 180 tablet 3    tamsulosin (Flomax) 0.4 mg 24 hr capsule Take 2  "capsules (0.8 mg) by mouth once daily. 180 capsule 3     No current facility-administered medications for this visit.       Objective   Visit Vitals  /63 (BP Location: Left arm, Patient Position: Sitting, BP Cuff Size: Adult)   Pulse 73   Temp 36.6 °C (97.8 °F)   Ht 1.651 m (5' 5\")   Wt 64.2 kg (141 lb 9.6 oz)   SpO2 98%   BMI 23.56 kg/m²   Smoking Status Former   BSA 1.72 m²     Physical Exam  Constitutional:       Cachexia of chronic disease  HENT:      Head: Normocephalic and atraumatic.      Nose: Nose normal.   Eyes:      Extraocular Movements: Extraocular movements intact.      Conjunctiva/sclera: Conjunctivae normal.   Cardiovascular:      S1-S2 or S3 pulmonary:      Effort: Pulmonary effort is normal.      Breath moderate crackles rales rhonchi  Skin:     General: Skin is warm.  Dermatitis cellulitis  Neurological:      Mental Status: He is alert and oriented to person, place, and time.  Neuropathy radiculopathy  Psychiatric:         Mood and Affect: Mood normal.         Behavior: Behavior normal.  Anxiety depression  Immunization History   Administered Date(s) Administered    Influenza, High Dose Seasonal, Preservative Free 01/14/2016    Influenza, Unspecified 12/15/2014, 09/27/2017    Influenza, injectable, quadrivalent, preservative free 09/18/2018    Influenza, seasonal, injectable 10/03/2013, 09/18/2018    Pfizer Purple Cap SARS-CoV-2 03/09/2021, 04/02/2021    Pneumococcal Polysaccharide PPSV23 02/29/2012    Tdap 03/27/2018    Zoster, Recombinant 10/12/2020, 12/28/2020    Zoster, Unspecified 10/12/2020, 12/28/2020       Review of Systems    Lab on 06/01/2023   Component Date Value Ref Range Status    Vitamin D, 25-Hydroxy 06/01/2023 47  ng/mL Final    Prostate Specific Antigen,Screen 06/01/2023 1.78  0.00 - 4.00 ng/mL Final    TSH 06/01/2023 5.93 (H)  0.44 - 3.98 mIU/L Final    Cholesterol 06/01/2023 186  0 - 199 mg/dL Final    HDL 06/01/2023 67.1  mg/dL Final    Cholesterol/HDL Ratio 06/01/2023 " 2.8   Final    LDL 06/01/2023 108 (H)  0 - 99 mg/dL Final    VLDL 06/01/2023 11  0 - 40 mg/dL Final    Triglycerides 06/01/2023 57  0 - 149 mg/dL Final    Glucose 06/01/2023 93  74 - 99 mg/dL Final    Sodium 06/01/2023 143  136 - 145 mmol/L Final    Potassium 06/01/2023 4.1  3.5 - 5.3 mmol/L Final    Chloride 06/01/2023 102  98 - 107 mmol/L Final    Bicarbonate 06/01/2023 31  21 - 32 mmol/L Final    Anion Gap 06/01/2023 14  10 - 20 mmol/L Final    Urea Nitrogen 06/01/2023 37 (H)  6 - 23 mg/dL Final    Creatinine 06/01/2023 1.84 (H)  0.50 - 1.30 mg/dL Final    GFR MALE 06/01/2023 33 (A)  >90 mL/min/1.73m2 Final    Calcium 06/01/2023 9.6  8.6 - 10.6 mg/dL Final    Albumin 06/01/2023 4.0  3.4 - 5.0 g/dL Final    Alkaline Phosphatase 06/01/2023 77  33 - 136 U/L Final    Total Protein 06/01/2023 6.9  6.4 - 8.2 g/dL Final    AST 06/01/2023 21  9 - 39 U/L Final    Total Bilirubin 06/01/2023 0.9  0.0 - 1.2 mg/dL Final    ALT (SGPT) 06/01/2023 11  10 - 52 U/L Final    WBC 06/01/2023 3.9 (L)  4.4 - 11.3 x10E9/L Final    nRBC 06/01/2023 0.0  0.0 - 0.0 /100 WBC Final    RBC 06/01/2023 3.31 (L)  4.50 - 5.90 x10E12/L Final    Hemoglobin 06/01/2023 11.2 (L)  13.5 - 17.5 g/dL Final    Hematocrit 06/01/2023 34.8 (L)  41.0 - 52.0 % Final    MCV 06/01/2023 105 (H)  80 - 100 fL Final    MCHC 06/01/2023 32.2  32.0 - 36.0 g/dL Final    Platelets 06/01/2023 128 (L)  150 - 450 x10E9/L Final    RDW 06/01/2023 13.6  11.5 - 14.5 % Final    WBC, Urine 06/01/2023 7 (A)  0 - 5 /HPF Final    RBC, Urine 06/01/2023 2  0 - 5 /HPF Final    Squamous Epithelial Cells, Urine 06/01/2023 2  /HPF Final    Bacteria, Urine 06/01/2023 1+ (A)  /HPF Final    Mucus, Urine 06/01/2023 1+  /LPF Final    Hyaline Casts, Urine 06/01/2023 2+ (A)  /LPF Final    Free T4 06/01/2023 0.92  0.78 - 1.48 ng/dL Final       Radiology: Reviewed imaging in powerchart.  No results found.    Family History   Problem Relation Name Age of Onset    Dementia Mother      Glaucoma Mother       No Known Problems Father       Social History     Socioeconomic History    Marital status:      Spouse name: None    Number of children: None    Years of education: None    Highest education level: None   Occupational History    None   Tobacco Use    Smoking status: Former     Packs/day: 1.00     Years: 30.00     Total pack years: 30.00     Types: Cigarettes    Smokeless tobacco: Never   Substance and Sexual Activity    Alcohol use: Yes     Alcohol/week: 3.0 standard drinks of alcohol     Types: 3 Shots of liquor per week    Drug use: Never    Sexual activity: None   Other Topics Concern    None   Social History Narrative    None     Social Determinants of Health     Financial Resource Strain: Not on file   Food Insecurity: Not on file   Transportation Needs: Not on file   Physical Activity: Not on file   Stress: Not on file   Social Connections: Not on file   Intimate Partner Violence: Not on file   Housing Stability: Not on file     Past Medical History:   Diagnosis Date    Abnormal weight loss 08/23/2017    Weight loss, non-intentional    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, 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/30/2018    Traumatic amputation of fingertip, subsequent encounter    Do not resuscitate 06/08/2020    DNR (do not resuscitate)    Encounter for general adult medical examination without abnormal findings 08/24/2020    Medicare annual wellness visit, subsequent    Epigastric pain 06/17/2021    Midepigastric pain    Follicular cyst of the skin and subcutaneous tissue, unspecified 07/09/2019    Skin cyst    Impacted cerumen, bilateral 03/20/2017    Bilateral impacted cerumen    Left lower quadrant pain     Abdominal pain, LLQ    Localized edema 09/30/2021    Edema of both legs    Localized swelling, mass and lump, unspecified upper limb 08/23/2017    Shoulder mass    Low back pain, unspecified 09/18/2018    Acute left-sided low back pain without sciatica    Other conditions  influencing health status 03/20/2017    History of cough    Other conditions influencing health status 01/05/2015    White coat hypertension    Other specified soft tissue disorders 02/25/2021    Swelling of both lower extremities    Periodic limb movement disorder 10/27/2017    Periodic limb movement disorder    Personal history of diseases of the blood and blood-forming organs and certain disorders involving the immune mechanism 01/06/2015    History of macrocytosis    Personal history of other diseases of male genital organs 02/02/2021    History of balanitis    Personal history of other diseases of male genital organs 02/01/2021    History of phimosis of penis    Personal history of other diseases of the respiratory system 10/26/2016    History of bronchiectasis    Personal history of other diseases of the respiratory system 10/26/2016    History of acute bronchitis    Personal history of other drug therapy 12/15/2014    History of influenza vaccination    Personal history of other endocrine, nutritional and metabolic disease     History of hypercholesterolemia    Personal history of other mental and behavioral disorders 06/03/2019    History of depression    Personal history of other specified conditions 08/24/2020    History of insomnia    Personal history of other specified conditions 08/24/2020    History of gynecomastia    Personal history of other specified conditions 09/25/2019    History of urinary frequency    Personal history of other specified conditions 08/23/2017    History of paresthesia    Personal history of other specified conditions 12/27/2020    History of dysphagia    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

## 2023-07-21 ENCOUNTER — TELEPHONE (OUTPATIENT)
Dept: PRIMARY CARE | Facility: CLINIC | Age: 88
End: 2023-07-21
Payer: COMMERCIAL

## 2023-07-21 LAB — URINE CULTURE: ABNORMAL

## 2023-07-21 NOTE — TELEPHONE ENCOUNTER
----- Message from Pedro Montemayor MD sent at 7/21/2023  9:48 AM EDT -----  Hyperglycemia chronic kidney disease pancytopenia advised to see hematologist Dr. Lozano    Follow-up 4 weeks

## 2023-07-28 ENCOUNTER — TELEPHONE (OUTPATIENT)
Dept: PRIMARY CARE | Facility: CLINIC | Age: 88
End: 2023-07-28
Payer: COMMERCIAL

## 2023-09-20 ENCOUNTER — TELEPHONE (OUTPATIENT)
Dept: PRIMARY CARE | Facility: CLINIC | Age: 88
End: 2023-09-20
Payer: COMMERCIAL

## 2023-09-20 NOTE — TELEPHONE ENCOUNTER
Pt daughter called in regarding pt appointment Friday, stated he is bringing in a list of concerns. Wants to know if she can be called either during the visit or after to discuss treatment plans. Please advise.

## 2023-09-22 ENCOUNTER — OFFICE VISIT (OUTPATIENT)
Dept: PRIMARY CARE | Facility: CLINIC | Age: 88
End: 2023-09-22
Payer: COMMERCIAL

## 2023-09-22 VITALS
TEMPERATURE: 97.3 F | HEIGHT: 65 IN | WEIGHT: 142 LBS | DIASTOLIC BLOOD PRESSURE: 67 MMHG | BODY MASS INDEX: 23.66 KG/M2 | HEART RATE: 62 BPM | SYSTOLIC BLOOD PRESSURE: 138 MMHG | OXYGEN SATURATION: 96 %

## 2023-09-22 DIAGNOSIS — N18.31 STAGE 3A CHRONIC KIDNEY DISEASE (MULTI): ICD-10-CM

## 2023-09-22 DIAGNOSIS — R11.0 NAUSEA: ICD-10-CM

## 2023-09-22 DIAGNOSIS — N39.43 URINARY DRIBBLING: Primary | ICD-10-CM

## 2023-09-22 DIAGNOSIS — M25.511 CHRONIC RIGHT SHOULDER PAIN: ICD-10-CM

## 2023-09-22 DIAGNOSIS — D61.818 OTHER PANCYTOPENIA (MULTI): ICD-10-CM

## 2023-09-22 DIAGNOSIS — G89.29 CHRONIC RIGHT SHOULDER PAIN: ICD-10-CM

## 2023-09-22 PROBLEM — L89.322 PRESSURE INJURY OF LEFT BUTTOCK, STAGE 2 (MULTI): Status: ACTIVE | Noted: 2023-09-22

## 2023-09-22 PROBLEM — N48.22 PENILE CELLULITIS: Status: RESOLVED | Noted: 2023-04-14 | Resolved: 2023-09-22

## 2023-09-22 PROBLEM — B07.0 PLANTAR WART OF RIGHT FOOT: Status: ACTIVE | Noted: 2023-09-22

## 2023-09-22 LAB
NON-UH HIE A/G RATIO: 1.6
NON-UH HIE ALB: 3.9 G/DL (ref 3.4–5)
NON-UH HIE ALK PHOS: 99 UNIT/L (ref 46–116)
NON-UH HIE APPEARANCE, U: CLEAR
NON-UH HIE BASO COUNT: 0.04 X1000 (ref 0–0.2)
NON-UH HIE BASOS %: 0.9 %
NON-UH HIE BILIRUBIN, TOTAL: 0.5 MG/DL (ref 0.2–1)
NON-UH HIE BILIRUBIN, U: NEGATIVE
NON-UH HIE BLOOD, U: NEGATIVE
NON-UH HIE BUN/CREAT RATIO: 22.1
NON-UH HIE BUN: 31 MG/DL (ref 9–23)
NON-UH HIE CALCIUM: 9 MG/DL (ref 8.7–10.4)
NON-UH HIE CALCULATED OSMOLALITY: 290 MOSM/KG (ref 275–295)
NON-UH HIE CHLORIDE: 104 MMOL/L (ref 98–107)
NON-UH HIE CO2, VENOUS: 30 MMOL/L (ref 20–31)
NON-UH HIE COLOR, U: YELLOW
NON-UH HIE CREATININE: 1.4 MG/DL (ref 0.6–1.1)
NON-UH HIE DIFF?: NO
NON-UH HIE EOS COUNT: 0.12 X1000 (ref 0–0.5)
NON-UH HIE EOSIN %: 3.2 %
NON-UH HIE GFR AA: 57
NON-UH HIE GLOBULIN: 2.5 G/DL
NON-UH HIE GLOMERULAR FILTRATION RATE: 47 ML/MIN/1.73M?
NON-UH HIE GLUCOSE QUAL, U: NEGATIVE
NON-UH HIE GLUCOSE: 100 MG/DL (ref 74–106)
NON-UH HIE GOT: 23 UNIT/L (ref 15–37)
NON-UH HIE GPT: 13 UNIT/L (ref 10–49)
NON-UH HIE HCT: 29.7 % (ref 41–52)
NON-UH HIE HGB: 10.3 G/DL (ref 13.5–17.5)
NON-UH HIE INSTR WBC: 3.9
NON-UH HIE K: 4.4 MMOL/L (ref 3.5–5.1)
NON-UH HIE KETONES, U: NEGATIVE
NON-UH HIE LEUKOCYTE ESTERASE, U: NEGATIVE
NON-UH HIE LYMPH %: 23.4 %
NON-UH HIE LYMPH COUNT: 0.92 X1000 (ref 1.2–4.8)
NON-UH HIE MCH: 34.6 PG (ref 27–34)
NON-UH HIE MCHC: 34.9 G/DL (ref 32–37)
NON-UH HIE MCV: 99.3 FL (ref 80–100)
NON-UH HIE MONO %: 8 %
NON-UH HIE MONO COUNT: 0.31 X1000 (ref 0.1–1)
NON-UH HIE MPV: 8.1 FL (ref 7.4–10.4)
NON-UH HIE NA: 142 MMOL/L (ref 135–145)
NON-UH HIE NEUTROPHIL %: 64.5 %
NON-UH HIE NEUTROPHIL COUNT (ANC): 2.52 X1000 (ref 1.4–8.8)
NON-UH HIE NITRITE, U: NEGATIVE
NON-UH HIE NUCLEATED RBC: 0 /100WBC
NON-UH HIE PH, U: 5.5 (ref 4.5–8)
NON-UH HIE PLATELET: 130 X10 (ref 150–450)
NON-UH HIE PROSTATIC SPECIFIC ANTIGEN: 1.3 NG/ML (ref 0–4)
NON-UH HIE PROTEIN, U: NEGATIVE
NON-UH HIE RBC: 2.99 X10 (ref 4.7–6.1)
NON-UH HIE RDW: 14.1 % (ref 11.5–14.5)
NON-UH HIE SPECIFIC GRAVITY, U: 1.01 (ref 1–1.03)
NON-UH HIE TOTAL PROTEIN: 6.4 G/DL (ref 5.7–8.2)
NON-UH HIE TSH: 2.51 UIU/ML (ref 0.55–4.78)
NON-UH HIE U MICRO: NORMAL
NON-UH HIE URIC ACID: 8.4 MG/DL (ref 3.7–9.2)
NON-UH HIE UROBILINOGEN QUAL, U: NORMAL
NON-UH HIE WBC: 3.9 X10 (ref 4.5–11)

## 2023-09-22 PROCEDURE — 1036F TOBACCO NON-USER: CPT | Performed by: INTERNAL MEDICINE

## 2023-09-22 PROCEDURE — 1159F MED LIST DOCD IN RCRD: CPT | Performed by: INTERNAL MEDICINE

## 2023-09-22 PROCEDURE — 3075F SYST BP GE 130 - 139MM HG: CPT | Performed by: INTERNAL MEDICINE

## 2023-09-22 PROCEDURE — 3078F DIAST BP <80 MM HG: CPT | Performed by: INTERNAL MEDICINE

## 2023-09-22 PROCEDURE — 96372 THER/PROPH/DIAG INJ SC/IM: CPT | Performed by: INTERNAL MEDICINE

## 2023-09-22 PROCEDURE — 1160F RVW MEDS BY RX/DR IN RCRD: CPT | Performed by: INTERNAL MEDICINE

## 2023-09-22 PROCEDURE — 99214 OFFICE O/P EST MOD 30 MIN: CPT | Performed by: INTERNAL MEDICINE

## 2023-09-22 PROCEDURE — 1125F AMNT PAIN NOTED PAIN PRSNT: CPT | Performed by: INTERNAL MEDICINE

## 2023-09-22 RX ORDER — PANTOPRAZOLE SODIUM 40 MG/1
40 TABLET, DELAYED RELEASE ORAL DAILY
Qty: 90 TABLET | Refills: 1 | Status: SHIPPED | OUTPATIENT
Start: 2023-09-22

## 2023-09-22 RX ORDER — TRIAMCINOLONE ACETONIDE 40 MG/ML
40 INJECTION, SUSPENSION INTRA-ARTICULAR; INTRAMUSCULAR ONCE
Status: COMPLETED | OUTPATIENT
Start: 2023-09-22 | End: 2023-09-22

## 2023-09-22 RX ADMIN — TRIAMCINOLONE ACETONIDE 40 MG: 40 INJECTION, SUSPENSION INTRA-ARTICULAR; INTRAMUSCULAR at 14:19

## 2023-09-22 NOTE — ASSESSMENT & PLAN NOTE
Send sed rate uric acid x-ray of the both shoulders physical Occupational Therapy given Kenalog injection 60 mg right shoulder without any complication

## 2023-09-22 NOTE — PROGRESS NOTES
Subjective   Patient ID: Isra Steven is a 89 y.o. male who presents for Follow-up (2 month /Right heel painful wart/Buttock- ulcer between cheeks /Nausea on and off /Pain in both shoulders/Urethra inflammation/).    Assessment/Plan     Problem List Items Addressed This Visit       Other pancytopenia (CMS/HCC)     Check iron B12 folic acid         Chronic kidney disease     CKD and various stages of CKD and significance explained, CKD is very common and rising diagnosis among [place] adults. Main culprits for CKD etiology needs to be attended well. GFR values explained. Nephrotoxic agents has to be avoided, plenty of water consumption is always beneficial. Avoid NSAIDs, always check with MD about usage of OTC medications or any other Rx given by other providers. GFR less then 10 usually will need renal replacement therapy. Episodic check on renal functions needed. Always ask MD about GFR at next visit. Avoid dehydration.           Nausea     Secondary to gastritis given Protonix         Chronic right shoulder pain     Send sed rate uric acid x-ray of the both shoulders physical Occupational Therapy given Kenalog injection 60 mg right shoulder without any complication         Urinary dribbling - Primary     Advise condom catheter check urinalysis PSA          Patient was evaluated today, problem list was reviewed, problems and concerns addressed, Rx list reviewed and updated, lab and tests were noted and reviewed. Life style changes were discussed, always it works better if we eat plant based diet and plenty of fibres and roughage. Consume adequate amount of water and avoid alcohol, light to moderate physical activities and stress reduction are always beneficial for ongoing physical well being. Do not forget to have 6 to 7 hours of sleep regularly and avoid late night sigifredo screen exposure.    HPI  This is a 89-year-old patient complaining the right foot right heel pain onset acutely duration 4 weeks progress slowly  diagnosis work refer patient to podiatry X Dr. Shine    Complaining of the buttocks ulcerations both right and left possible related to the old age protein calorie malnutrition leaking of the urine refer to Dr. 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, 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/30/2018    Traumatic amputation of fingertip, subsequent encounter    Do not resuscitate 06/08/2020    DNR (do not resuscitate)    Encounter for general adult medical examination without abnormal findings 08/24/2020    Medicare annual wellness visit, subsequent    Epigastric pain 06/17/2021    Midepigastric pain    Follicular cyst of the skin and subcutaneous tissue, unspecified 07/09/2019    Skin cyst    Impacted cerumen, bilateral 03/20/2017    Bilateral impacted cerumen    Left lower quadrant pain     Abdominal pain, LLQ    Localized edema 09/30/2021    Edema of both legs    Localized swelling, mass and lump, unspecified upper limb 08/23/2017    Shoulder mass    Low back pain, unspecified 09/18/2018    Acute left-sided low back pain without sciatica    Other conditions influencing health status 03/20/2017    History of cough    Other conditions influencing health status 01/05/2015    White coat hypertension    Other specified soft tissue disorders 02/25/2021    Swelling of both lower extremities    Periodic limb movement disorder 10/27/2017    Periodic limb  movement disorder    Personal history of diseases of the blood and blood-forming organs and certain disorders involving the immune mechanism 01/06/2015    History of macrocytosis    Personal history of other diseases of male genital organs 02/02/2021    History of balanitis    Personal history of other diseases of male genital organs 02/01/2021    History of phimosis of penis    Personal history of other diseases of the respiratory system 10/26/2016    History of bronchiectasis    Personal history of other diseases of the respiratory system 10/26/2016    History of acute bronchitis    Personal history of other drug therapy 12/15/2014    History of influenza vaccination    Personal history of other endocrine, nutritional and metabolic disease     History of hypercholesterolemia    Personal history of other mental and behavioral disorders 06/03/2019    History of depression    Personal history of other specified conditions 08/24/2020    History of insomnia    Personal history of other specified conditions 08/24/2020    History of gynecomastia    Personal history of other specified conditions 09/25/2019    History of urinary frequency    Personal history of other specified conditions 08/23/2017    History of paresthesia    Personal history of other specified conditions 12/27/2020    History of dysphagia    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  30 g 0    doxazosin (Cardura) 4 mg tablet TAKE 1 TABLET BY MOUTH EVERYDAY AT BEDTIME 90 tablet 3    DULoxetine (Cymbalta) 30 mg DR capsule Take 1 capsule (30 mg) by mouth once daily. 90 capsule 3    ergocalciferol (Vitamin D-2) 1.25 MG (18317 UT) capsule Take 1 capsule (1,250 mcg) by mouth 1 (one) time per week. 12 capsule 0    finasteride (Proscar) 5 mg tablet Take 1 tablet (5 mg) by mouth once daily. 90 tablet 3    furosemide (Lasix) 40 mg tablet Take 1 tablet (40 mg) by mouth once daily in the morning. Take before meals. 90 tablet 3    gabapentin (Neurontin) 300 mg capsule Take 1 capsule (300 mg) by mouth once daily at bedtime. 90 capsule 1    omeprazole (PriLOSEC) 20 mg DR capsule Take 1 capsule (20 mg) by mouth once daily. 90 capsule 3    potassium chloride CR 10 mEq ER tablet Take 1 tablet (10 mEq) by mouth once daily. 90 tablet 3    rOPINIRole (Requip) 4 mg tablet Take 1 tablet (4 mg) by mouth 2 times a day. 180 tablet 3    tamsulosin (Flomax) 0.4 mg 24 hr capsule Take 2 capsules (0.8 mg) by mouth once daily. 180 capsule 3     No current facility-administered medications for this visit.     Family History   Problem Relation Name Age of Onset    Dementia Mother      Glaucoma Mother      No Known Problems Father       Social History     Socioeconomic History    Marital status:      Spouse name: None    Number of children: None    Years of education: None    Highest education level: None   Occupational History    None   Tobacco Use    Smoking status: Former     Packs/day: 1.00     Years: 30.00     Additional pack years: 0.00     Total pack years: 30.00     Types: Cigarettes    Smokeless tobacco: Never   Substance and Sexual Activity    Alcohol use: Yes     Alcohol/week: 3.0 standard drinks of alcohol     Types: 3 Shots of liquor per week    Drug use: Never    Sexual activity: None   Other Topics Concern    None   Social History Narrative    None     Social Determinants of Health     Financial Resource  "Strain: Not on file   Food Insecurity: Not on file   Transportation Needs: Not on file   Physical Activity: Not on file   Stress: Not on file   Social Connections: Not on file   Intimate Partner Violence: Not on file   Housing Stability: Not on file     Immunization History   Administered Date(s) Administered    Flu vaccine (IIV4), preservative free *Check age/dose* 09/18/2018    Influenza, High Dose Seasonal, Preservative Free 01/14/2016    Influenza, Unspecified 12/15/2014, 09/27/2017, 09/18/2018    Influenza, seasonal, injectable 10/03/2013, 12/15/2014, 09/27/2017, 09/18/2018    Pfizer Purple Cap SARS-CoV-2 03/09/2021, 04/02/2021    Pneumococcal polysaccharide vaccine, 23-valent, age 2 years and older (PNEUMOVAX 23) 02/29/2012    SARS-CoV-2, Unspecified 03/09/2021, 04/02/2021, 12/10/2021    Tdap vaccine, age 7 year and older (BOOSTRIX) 03/27/2018    Zoster vaccine, recombinant, adult (SHINGRIX) 10/12/2020, 12/28/2020    Zoster, Unspecified 10/12/2020, 12/28/2020       Review of Systems  Review of systems is otherwise negative unless stated above or in history of present illness.    Objective   Visit Vitals  /67 (BP Location: Left arm, Patient Position: Sitting, BP Cuff Size: Adult)   Pulse 62   Temp 36.3 °C (97.3 °F)   Ht 1.651 m (5' 5\")   Wt 64.4 kg (142 lb)   SpO2 96%   BMI 23.63 kg/m²   Smoking Status Former   BSA 1.72 m²     Physical Exam  Constitutional: Cachexia of chronic disease     General: not in acute distress.   HENT:      Head: Normocephalic and atraumatic.      Nose: Nose normal.   Eyes: Eyeglasses     Extraocular Movements: Extraocular movements intact.      Conjunctiva/sclera: Conjunctivae normal.   Cardiovascular: Heart murmur     Rate and Rhythm: Normal rate ,  No M/R/G  Pulmonary: Rhonchi     Effort: Pulmonary effort is normal.      Breath sounds: Normal, Bilat Equal AE  Skin: Stage II pressure ulcer gluteal fold     General: Skin is warm.   Neurological: Neuropathy upper lower extremity " bilaterally     Mental Status: He is alert and oriented to person, place, and time.   Psychiatric:   Anxiety   Mood and Affect: Mood normal.         Behavior: Behavior normal.   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  No results found.      Charting was completed using voice recognition technology and may include unintended errors.

## 2023-09-26 ENCOUNTER — TELEPHONE (OUTPATIENT)
Dept: PRIMARY CARE | Facility: CLINIC | Age: 88
End: 2023-09-26
Payer: COMMERCIAL

## 2023-09-26 NOTE — TELEPHONE ENCOUNTER
"Tried calling pt with results no answer. LVM.   Per Dr. Montemayor - \"Chronic kidney disease with anemia thrombocytopenia advised Slow Fe 1 a day multivitamin B12 OTC cut down salt and protein drink more water repeat CBC BMP 2 months follow-up 2 months bring all medication with you \"     Also xray showed moderate arthritis advised to come for another cortisone injection and physical therapy.  "

## 2023-10-20 ENCOUNTER — OFFICE VISIT (OUTPATIENT)
Dept: SURGERY | Facility: CLINIC | Age: 88
End: 2023-10-20
Payer: COMMERCIAL

## 2023-10-20 DIAGNOSIS — S30.810D EXCORIATION OF BUTTOCK, SUBSEQUENT ENCOUNTER: Primary | ICD-10-CM

## 2023-10-20 DIAGNOSIS — E55.9 VITAMIN D DEFICIENCY: ICD-10-CM

## 2023-10-20 PROCEDURE — 99213 OFFICE O/P EST LOW 20 MIN: CPT | Performed by: PHYSICIAN ASSISTANT

## 2023-10-20 PROCEDURE — 1160F RVW MEDS BY RX/DR IN RCRD: CPT | Performed by: PHYSICIAN ASSISTANT

## 2023-10-20 PROCEDURE — 1125F AMNT PAIN NOTED PAIN PRSNT: CPT | Performed by: PHYSICIAN ASSISTANT

## 2023-10-20 PROCEDURE — 1036F TOBACCO NON-USER: CPT | Performed by: PHYSICIAN ASSISTANT

## 2023-10-20 PROCEDURE — 1159F MED LIST DOCD IN RCRD: CPT | Performed by: PHYSICIAN ASSISTANT

## 2023-10-20 NOTE — PROGRESS NOTES
Subjective   Patient ID: Isra Steven is a 89 y.o. male who presents for Follow-up (Decubitus ulcer buttocks).    HPI  Is a 89-year-old gentleman who has been seen here previously with the same complaint.  Patient has some excoriation of his buttock area and he has pain when he sits there.  He saw wound center last year for quite some time and they were treating it.  He stopped going.  He the daughter-in-law states its back she is not sure if it is worse than it was before.    Review of Systems  Negative other than mentioned in HPI    ENT: No earache, no sore throat, no nosebleeds  Cardiovascular: No chest pain, no shortness of breath, no leg pain, no edema  Respiratory: No shortness of breath on exertion, no wheezing  Gastrointestinal: No abdominal pain, no melena, no nausea, vomiting and/or diarrhea  Buttock pain  Musculoskeletal: No pain moving all extremities, no back pain ambulating normally  Skin: No rashes, no lesions, and no skin changes  Neuro: No headache, no confusion, no numbness and tingling  Psychiatric, normal mood, not suicidal, not homicidal, feeling good      Physical Exam  Eyes: Conjunctiva non -icteric and eye lids are without obvious rash or drooping. Pupils are symmetric.   Ears, Nose, Mouth, and Throat: External ears and nose appear to be without deformity or rash. No lesions or masses noted. Hearing is grossly intact.   Neck:. No JVD noted, tracheal position is midline. No thyromegaly, no thyroid nodules  Head and Face: Examination of the head and face revealed no abnormalities.   Respiratory: No gasping or shortness of breath noted, no use of accessory muscles noted. Clear to auscultate bilaterally  Cardiovascular: Examination for edema is normal. Regular rate and rhythm S1 S2 without murmurs  GI: Abdomen no tender to palpation, bowel sounds present no hepatosplenomegaly  Examination of the buttock area shows excoriation but no decubitus some dry skin and areas from previous.  I have seen  this patient with the same issue before it is actually improved since the last time I saw him he was going to wound care.  Skin: No rashes or open lesions/ulcers identified on skin.   Musk: Digits/nails show no clubbing or cyanosis. No asymmetry or masses noted of the musculature. Examination of the muscles/joints/bones show normal range of motion. Gait is grossly normally.   Neurologic: Cranial nerves II- XII intact, motor strength 5/5 muscle strength of the lower extremities bilaterally and equal.          No diagnosis found.   Patient Active Problem List   Diagnosis    Carpal tunnel syndrome of right wrist    Other pancytopenia (CMS/HCC)    HTN (hypertension)    Hypothyroidism    Osteoarthritis of toe    RLS (restless legs syndrome)    Vitamin B 12 deficiency    Vitamin D deficiency    Benign prostatic hyperplasia with urinary frequency    Gastroesophageal reflux disease without esophagitis    Chronic kidney disease    Medicare annual wellness visit, subsequent    Pressure injury of elbow, stage 2, unspecified laterality (CMS/HCC)    Recurrent major depressive disorder, in partial remission (CMS/HCC)    Bunion of great toe    Moderate protein-calorie malnutrition (CMS/HCC)    Systolic heart failure, unspecified HF chronicity (CMS/HCC)    Atherosclerosis of aorta (CMS/HCC)    Bronchiectasis with acute exacerbation (CMS/HCC)    Thrombocytopenia, unspecified (CMS/HCC)    Plantar wart of right foot    Pressure injury of left buttock, stage 2 (CMS/HCC)    Nausea    Chronic right shoulder pain    Urinary dribbling      Allergies   Allergen Reactions    Brimonidine Itching    Dorzolamide-Timolol Itching    Metipranolol Itching    Timolol Itching    Travoprost Itching      Medication Documentation Review Audit       Reviewed by Soniya Sanchez MA (Medical Assistant) on 10/20/23 at 1131      Medication Order Taking? Sig Documenting Provider Last Dose Status   bacitracin 500 unit/gram ointment [id number] Yes Apply topically  2 times a day. Pedro Montemayor MD Taking Active   doxazosin (Cardura) 4 mg tablet [id number] Yes TAKE 1 TABLET BY MOUTH EVERYDAY AT BEDTIME Pedro Montemayor MD Taking Active   ergocalciferol (Vitamin D-2) 1.25 MG (83002 UT) capsule [id number] Yes Take 1 capsule (1,250 mcg) by mouth 1 (one) time per week. Pedro Montemayor MD Taking Active   finasteride (Proscar) 5 mg tablet [id number] Yes Take 1 tablet (5 mg) by mouth once daily. Pedro Montemayor MD Taking Active   furosemide (Lasix) 40 mg tablet [id number] Yes Take 1 tablet (40 mg) by mouth once daily in the morning. Take before meals. Pedro Montemayor MD Taking Active   gabapentin (Neurontin) 300 mg capsule [id number] Yes Take 1 capsule (300 mg) by mouth once daily at bedtime. Pedro Montemayor MD Taking Active   pantoprazole (ProtoNix) 40 mg EC tablet [id number] Yes Take 1 tablet (40 mg) by mouth once daily. Do not crush, chew, or split. Pedro Montemayor MD Taking Active   potassium chloride CR 10 mEq ER tablet [id number] Yes Take 1 tablet (10 mEq) by mouth once daily. Pedro Montemayor MD Taking Active   rOPINIRole (Requip) 4 mg tablet [id number] Yes Take 1 tablet (4 mg) by mouth 2 times a day. Pedro Montemayor MD Taking Active   tamsulosin (Flomax) 0.4 mg 24 hr capsule [id number] Yes Take 2 capsules (0.8 mg) by mouth once daily. 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, 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/30/2018    Traumatic amputation of fingertip, subsequent encounter    Do not resuscitate 06/08/2020    DNR (do not resuscitate)    Encounter for general adult medical examination without abnormal findings 08/24/2020    Medicare annual wellness visit, subsequent    Epigastric pain 06/17/2021    Midepigastric pain    Follicular cyst of the skin and subcutaneous tissue, unspecified  07/09/2019    Skin cyst    Impacted cerumen, bilateral 03/20/2017    Bilateral impacted cerumen    Left lower quadrant pain     Abdominal pain, LLQ    Localized edema 09/30/2021    Edema of both legs    Localized swelling, mass and lump, unspecified upper limb 08/23/2017    Shoulder mass    Low back pain, unspecified 09/18/2018    Acute left-sided low back pain without sciatica    Other conditions influencing health status 03/20/2017    History of cough    Other conditions influencing health status 01/05/2015    White coat hypertension    Other specified soft tissue disorders 02/25/2021    Swelling of both lower extremities    Periodic limb movement disorder 10/27/2017    Periodic limb movement disorder    Personal history of diseases of the blood and blood-forming organs and certain disorders involving the immune mechanism 01/06/2015    History of macrocytosis    Personal history of other diseases of male genital organs 02/02/2021    History of balanitis    Personal history of other diseases of male genital organs 02/01/2021    History of phimosis of penis    Personal history of other diseases of the respiratory system 10/26/2016    History of bronchiectasis    Personal history of other diseases of the respiratory system 10/26/2016    History of acute bronchitis    Personal history of other drug therapy 12/15/2014    History of influenza vaccination    Personal history of other endocrine, nutritional and metabolic disease     History of hypercholesterolemia    Personal history of other mental and behavioral disorders 06/03/2019    History of depression    Personal history of other specified conditions 08/24/2020    History of insomnia    Personal history of other specified conditions 08/24/2020    History of gynecomastia    Personal history of other specified conditions 09/25/2019    History of urinary frequency    Personal history of other specified conditions 08/23/2017    History of paresthesia    Personal  history of other specified conditions 12/27/2020    History of dysphagia    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    Plantar fasciitis, right    Right lower quadrant pain 06/08/2020    Right lower quadrant abdominal pain     Social History     Tobacco Use   Smoking Status Former    Packs/day: 1.00    Years: 30.00    Additional pack years: 0.00    Total pack years: 30.00    Types: Cigarettes   Smokeless Tobacco Never     Family History   Problem Relation Name Age of Onset    Dementia Mother      Glaucoma Mother      No Known Problems Father        Past Surgical History:   Procedure Laterality Date    CATARACT EXTRACTION  12/12/2014    Cataract Extraction    HERNIA REPAIR  12/12/2014    Hernia Repair    HIP SURGERY      fracture       Assessment/Plan   After examining the patient the areas look like they are almost seborrheic recurrent dry areas.  I instructed the daughter-in-law soap and water daily plus she should apply Aquaphor at least twice a day.  Purchase a donut and have sent on it to help with any pain in the tailbone region.  If he does not get any improvement please return to the office.    Encounter Diagnosis   Name Primary?    Excoriation of buttock, subsequent encounter Yes         I have reviewed all data including labs,radiologic and previous reports.          **Portions of this medical record have been created using voice recognition software and may have minor errors which are inherent in voice recognition systems. It has not been fully edited for typographical or grammatical errors**

## 2023-10-23 RX ORDER — ERGOCALCIFEROL 1.25 MG/1
1 CAPSULE ORAL
Qty: 12 CAPSULE | Refills: 0 | Status: SHIPPED | OUTPATIENT
Start: 2023-10-23

## 2024-01-12 DIAGNOSIS — G25.81 RLS (RESTLESS LEGS SYNDROME): ICD-10-CM

## 2024-01-12 RX ORDER — GABAPENTIN 100 MG/1
300 CAPSULE ORAL NIGHTLY
Qty: 90 CAPSULE | Refills: 0 | OUTPATIENT
Start: 2024-01-12

## 2024-03-12 ENCOUNTER — LAB REQUISITION (OUTPATIENT)
Dept: LAB | Facility: HOSPITAL | Age: 89
End: 2024-03-12
Payer: COMMERCIAL

## 2024-03-13 LAB
LABORATORY COMMENT REPORT: NORMAL
PATH REPORT.GROSS SPEC: NORMAL
PATH REPORT.TOTAL CANCER: NORMAL